# Patient Record
Sex: MALE | Race: WHITE | Employment: FULL TIME | URBAN - METROPOLITAN AREA
[De-identification: names, ages, dates, MRNs, and addresses within clinical notes are randomized per-mention and may not be internally consistent; named-entity substitution may affect disease eponyms.]

---

## 2019-12-16 ENCOUNTER — OFFICE VISIT (OUTPATIENT)
Dept: FAMILY MEDICINE | Facility: OTHER | Age: 24
End: 2019-12-16
Payer: COMMERCIAL

## 2019-12-16 VITALS
WEIGHT: 284.6 LBS | BODY MASS INDEX: 35.39 KG/M2 | SYSTOLIC BLOOD PRESSURE: 148 MMHG | HEIGHT: 75 IN | RESPIRATION RATE: 16 BRPM | HEART RATE: 50 BPM | DIASTOLIC BLOOD PRESSURE: 88 MMHG | OXYGEN SATURATION: 99 % | TEMPERATURE: 97.9 F

## 2019-12-16 DIAGNOSIS — R03.0 ELEVATED BLOOD PRESSURE READING WITHOUT DIAGNOSIS OF HYPERTENSION: ICD-10-CM

## 2019-12-16 DIAGNOSIS — J01.00 ACUTE NON-RECURRENT MAXILLARY SINUSITIS: Primary | ICD-10-CM

## 2019-12-16 PROCEDURE — 99203 OFFICE O/P NEW LOW 30 MIN: CPT | Performed by: PHYSICIAN ASSISTANT

## 2019-12-16 RX ORDER — METHOCARBAMOL 500 MG/1
500 TABLET, FILM COATED ORAL PRN
COMMUNITY
Start: 2019-03-19 | End: 2021-05-07

## 2019-12-16 ASSESSMENT — MIFFLIN-ST. JEOR: SCORE: 2372.82

## 2019-12-16 NOTE — PROGRESS NOTES
Subjective     Guillermo Terrell is a 24 year old male who presents to clinic today for the following health issues:    HPI   Acute Illness   Acute illness concerns: possible sinus infection, has been off and on for the last couple of months  Onset: 3 days this time around.     Fever: no    Chills/Sweats: no    Headache (location?): YES- sometimes    Sinus Pressure:YES    Conjunctivitis:  no    Ear Pain: YES: both, has had a ringing in left ear for probably 2-3 months.     Rhinorrhea: YES    Congestion: YES    Sore Throat: YES     Cough: YES-non-productive    Wheeze: no    Decreased Appetite: yes, yesterday.     Nausea: YES- yesterday he felt sick to his stomach    Vomiting: no    Diarrhea:  no    Dysuria/Freq.: YES- frequency     Fatigue/Achiness: YES- but has had a messed up back for a long time     Sick/Strep Exposure: no     Therapies Tried and outcome: dayquil/nyquil, mucinex-d    Has been on and off sick for the last few months.  Always has issues with mucus in his throat, nasal congestion. Having constant tooth pain now.  No fevers during any of this. No history of sinus problems.     Current Outpatient Medications   Medication Sig Dispense Refill     Acetaminophen (TYLENOL PO) Take  by mouth.       amoxicillin-clavulanate (AUGMENTIN) 875-125 MG tablet Take 1 tablet by mouth 2 times daily for 10 days 20 tablet 0     ibuprofen (ADVIL,MOTRIN) 800 MG tablet Take 800 mg by mouth every 8 hours as needed.       methocarbamol (ROBAXIN) 500 MG tablet Take 500 mg by mouth as needed       Allergies   Allergen Reactions     Lorabid [Cephalosporins] Rash     When he was around [age] year old.       Reviewed and updated as needed this visit by Provider         Review of Systems   ROS COMP: Constitutional, HEENT, cardiovascular, pulmonary, gi and gu systems are negative, except as otherwise noted.      Objective    BP (!) 148/88 (BP Location: Left arm, Patient Position: Chair, Cuff Size: Adult Regular)   Pulse 50   Temp 97.9  " F (36.6  C) (Oral)   Resp 16   Ht 1.915 m (6' 3.39\")   Wt 129.1 kg (284 lb 9.6 oz)   SpO2 99%   BMI 35.20 kg/m    Body mass index is 35.2 kg/m .  Physical Exam   GENERAL: healthy, alert and no distress  EYES: Eyes grossly normal to inspection, PERRL and conjunctivae and sclerae normal  HENT: normal cephalic/atraumatic, ear canals and TM's normal, nasal mucosa edematous , rhinorrhea white, oropharynx clear, oral mucous membranes moist and sinuses: maxillary tenderness on bilateral, maxillary  NECK: no adenopathy, no asymmetry, masses, or scars and thyroid normal to palpation  RESP: lungs clear to auscultation - no rales, rhonchi or wheezes  CV: regular rate and rhythm, normal S1 S2, no S3 or S4, no murmur, click or rub, no peripheral edema and peripheral pulses strong  MS: no gross musculoskeletal defects noted, no edema    Diagnostic Test Results:  Labs reviewed in Epic        Assessment & Plan       ICD-10-CM    1. Acute non-recurrent maxillary sinusitis J01.00 amoxicillin-clavulanate (AUGMENTIN) 875-125 MG tablet   2. Elevated blood pressure reading without diagnosis of hypertension R03.0           High suspicion of sinusitis given the persistent symptoms.  Recommended nasal steroids, nasal saline rinses and continued use of decongestants.  Started on Augmentin as well.  Recommended tylenol/ibuprofen as needed.  Discussed how to use the OTC products.  Encouraged to follow-up in the next 2 weeks with nurse or pharmacy BP re-check once he is off decongestants.     Return in about 5 days (around 12/21/2019) for If not improving, sooner if worse or new concerns.     Options for treatment and follow-up care were reviewed with the patient and/or guardian. Patient and/or guardian engaged in the decision making process and verbalized understanding of the options discussed and agreed with the final plan.     Tiny Angel PA-C  [facility]"

## 2020-01-13 ENCOUNTER — NURSE TRIAGE (OUTPATIENT)
Dept: NURSING | Facility: CLINIC | Age: 25
End: 2020-01-13
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
## 2020-11-24 ENCOUNTER — VIRTUAL VISIT (OUTPATIENT)
Dept: FAMILY MEDICINE | Facility: OTHER | Age: 25
End: 2020-11-24
Payer: COMMERCIAL

## 2020-11-24 DIAGNOSIS — J30.89 ALLERGIC RHINITIS DUE TO OTHER ALLERGIC TRIGGER, UNSPECIFIED SEASONALITY: ICD-10-CM

## 2020-11-24 DIAGNOSIS — J32.0 CHRONIC MAXILLARY SINUSITIS: Primary | ICD-10-CM

## 2020-11-24 PROCEDURE — 99213 OFFICE O/P EST LOW 20 MIN: CPT | Mod: TEL | Performed by: PHYSICIAN ASSISTANT

## 2020-11-24 NOTE — PROGRESS NOTES
"Guillermo Terrell is a 24 year old male who is being evaluated via a billable telephone visit.      The patient has been notified of following:     \"This telephone visit will be conducted via a call between you and your physician/provider. We have found that certain health care needs can be provided without the need for a physical exam.  This service lets us provide the care you need with a short phone conversation.  If a prescription is necessary we can send it directly to your pharmacy.  If lab work is needed we can place an order for that and you can then stop by our lab to have the test done at a later time.    Telephone visits are billed at different rates depending on your insurance coverage. During this emergency period, for some insurers they may be billed the same as an in-person visit.  Please reach out to your insurance provider with any questions.    If during the course of the call the physician/provider feels a telephone visit is not appropriate, you will not be charged for this service.\"    Patient has given verbal consent for Telephone visit?  Yes    What phone number would you like to be contacted at? [phone]    How would you like to obtain your AVS? Mail a copy    Subjective     Guillermo Terrell is a 24 year old male who presents via phone visit today for the following health issues:    HPI     Acute Illness  Acute illness concerns: sinus infection  Onset/Duration: 2 days   Symptoms:  Fever: no  Chills/Sweats: YES  Headache (location?): no  Sinus Pressure: YES  Conjunctivitis:  YES  Ear Pain: no  Rhinorrhea: YES  Congestion: no  Sore Throat: no  Cough: YES-non-productive  Wheeze: no  Decreased Appetite: no  Nausea: no  Vomiting: no  Diarrhea: no  Dysuria/Freq.: no  Dysuria or Hematuria: no  Rashes: no  Loss of taste/smell: no  Fatigue/Achiness: YES  Sick/Strep Exposure: no  Therapies tried and outcome: None    - Woke up on Monday morning.  He has had a sore throat.  His teeth have been very painful.  " Sinuses have been plugged up really bad.    - He has had runny nose and cough prior to symptoms starting.   - Taking Mucinex D everyday pretty much regardless of acute infection  - Also uses nasal rinses and flonase.   - Getting COVID tested regularly for his work.  He is still waiting on COVID test that he did today at noon.   - He says he always gets sick and isn't sure why.  He always seems to have issues with recurrent colds.     Review of Systems   Constitutional, HEENT, cardiovascular, pulmonary, gi and gu systems are negative, except as otherwise noted.       Objective          Vitals:  No vitals were obtained today due to virtual visit.    healthy, alert and no distress  PSYCH: Alert and oriented times 3; coherent speech, normal   rate and volume, able to articulate logical thoughts, able   to abstract reason, no tangential thoughts, no hallucinations   or delusions  His affect is normal  RESP: No cough, no audible wheezing, able to talk in full sentences  Remainder of exam unable to be completed due to telephone visits    No results found for this or any previous visit (from the past 24 hour(s)).        Assessment/Plan:    Assessment & Plan     Guillermo was seen today for sinus problem.    Diagnoses and all orders for this visit:    Chronic maxillary sinusitis  -     amoxicillin-clavulanate (AUGMENTIN) 875-125 MG tablet; Take 1 tablet by mouth 2 times daily for 7 days    Allergic rhinitis due to other allergic trigger, unspecified seasonality  -     ALLERGY/ASTHMA ADULT REFERRAL              Elected to start antibiotics because he has issues with chronic rhinorrhea and nasal congestion and his sinus pain just developed in the past few days.   Did refer to allergy and may need to see ENT as well pending the evaluation for recurrent illnesses and allergy symptoms.  Recommended using Flonase 2 sprays BID and nasal saline rinses TID right now.  Continue on Mucinex D.   He will start on antibiotics.  If not  improving recommend evaluation F2F.     Return in about 1 week (around 12/1/2020) for If not improving, sooner if worse or new concerns.    Options for treatment and follow-up care were reviewed with the patient and/or guardian. Patient and/or guardian engaged in the decision making process and verbalized understanding of the options discussed and agreed with the final plan.    Tiny Angel PA-C  [facility]    Phone call duration:  5:20 minutes

## 2020-12-01 ENCOUNTER — OFFICE VISIT (OUTPATIENT)
Dept: ALLERGY | Facility: OTHER | Age: 25
End: 2020-12-01
Attending: PHYSICIAN ASSISTANT
Payer: COMMERCIAL

## 2020-12-01 VITALS
HEART RATE: 52 BPM | WEIGHT: 297 LBS | BODY MASS INDEX: 36.93 KG/M2 | DIASTOLIC BLOOD PRESSURE: 78 MMHG | HEIGHT: 75 IN | SYSTOLIC BLOOD PRESSURE: 137 MMHG | OXYGEN SATURATION: 97 %

## 2020-12-01 DIAGNOSIS — J30.1 SEASONAL ALLERGIC RHINITIS DUE TO POLLEN: Primary | ICD-10-CM

## 2020-12-01 PROBLEM — H10.9 RHINOCONJUNCTIVITIS: Status: ACTIVE | Noted: 2020-12-01

## 2020-12-01 PROBLEM — J31.0 RHINOCONJUNCTIVITIS: Status: ACTIVE | Noted: 2020-12-01

## 2020-12-01 PROCEDURE — 36415 COLL VENOUS BLD VENIPUNCTURE: CPT | Performed by: ALLERGY & IMMUNOLOGY

## 2020-12-01 PROCEDURE — 86003 ALLG SPEC IGE CRUDE XTRC EA: CPT | Performed by: ALLERGY & IMMUNOLOGY

## 2020-12-01 PROCEDURE — 95004 PERQ TESTS W/ALRGNC XTRCS: CPT | Performed by: ALLERGY & IMMUNOLOGY

## 2020-12-01 PROCEDURE — 99203 OFFICE O/P NEW LOW 30 MIN: CPT | Mod: 25 | Performed by: ALLERGY & IMMUNOLOGY

## 2020-12-01 ASSESSMENT — MIFFLIN-ST. JEOR: SCORE: 2422.81

## 2020-12-01 ASSESSMENT — PAIN SCALES - GENERAL: PAINLEVEL: NO PAIN (0)

## 2020-12-01 NOTE — PROGRESS NOTES
Guillermo Terrell is a 24 year old White male with previous medical history significant for allergic rhinitis. Guillermo Terrell is being seen today for evaluation of seasonal allergies. The patient is being seen in consultation at the request of Tiny Angel PA-C.     Patient reports that over the last few years he has had perennial with spring and fall nasal and ocular symptoms including ocular watering, ocular itching, congestion, throat itching, postnasal drainage and rhinorrhea.  Increase symptoms around cats.  Mucinex D has been beneficial.  Zyrtec, Claritin and Allegra have been tried and beneficial.  Uses Flonase 2 sprays per nostril approximately once per week.  This is beneficial when used.  No history of allergy testing.  Currently being treated for sinusitis.  He reports that he has had 2 total sinus infections.  No history of montelukast.  Nonatopic family history.    ENVIRONMENTAL HISTORY: [place] [facility] [place] The home is heated with a forced air. They do have central air conditioning. The patient's bedroom is furnished with hard bethel in bedroom.  Pets inside the house include 0 pets. There is no history of cockroach or mice infestation. There is/are 0 smokers in the house.  The house does not have a damp basement.     Past Medical History:   Diagnosis Date     Lyme disease 2000     Family History   Problem Relation Age of Onset     Breast Cancer Maternal Grandmother      Circulatory Paternal Grandmother         Annuersym     Diabetes Mother         Type 1     Diabetes Other         paternal cousin Type 1     Past Surgical History:   Procedure Laterality Date     TONSILLECTOMY & ADENOIDECTOMY  12/06       REVIEW OF SYSTEMS:  General: negative for weight gain. negative for weight loss. negative for changes in sleep.   Ears: negative for fullness. negative for hearing loss. negative for dizziness.   Nose: negative for snoring.negative for changes in smell. positive  for  drainage.   Eyes: positive  for eye watering. positive  for eye itching. negative for vision changes. negative for eye redness.  Throat: negative for hoarseness. negative for sore throat. negative for trouble swallowing.   Lungs: negative for shortness of breath.negative for wheezing. negative for sputum production.   Cardiovascular: negative for chest pain. negative for swelling of ankles. negative for fast or irregular heartbeat.   Gastrointestinal: negative for nausea. negative for heartburn. negative for acid reflux.   Musculoskeletal: negative for joint pain. negative for joint stiffness. negative for joint swelling.   Neurologic: negative for seizures. negative for fainting. negative for weakness.   Psychiatric: negative for changes in mood. negative for anxiety.   Endocrine: negative for cold intolerance. negative for heat intolerance. negative for tremors.   Lymphatic: negative for lower extremity swelling. negative for lymph node swelling.   Hematologic: negative for easy bruising. negative for easy bleeding.  Integumentary: negative for rash. negative for scaling. negative for nail changes.       Current Outpatient Medications:      Acetaminophen (TYLENOL PO), Take  by mouth., Disp: , Rfl:      amoxicillin-clavulanate (AUGMENTIN) 875-125 MG tablet, Take 1 tablet by mouth 2 times daily for 7 days, Disp: 14 tablet, Rfl: 0     ibuprofen (ADVIL,MOTRIN) 800 MG tablet, Take 800 mg by mouth every 8 hours as needed., Disp: , Rfl:      methocarbamol (ROBAXIN) 500 MG tablet, Take 500 mg by mouth as needed, Disp: , Rfl:      Pseudoephedrine-guaiFENesin (MUCINEX D PO), , Disp: , Rfl:   Immunization History   Administered Date(s) Administered     DTAP (<7y) 10/15/1997, 06/08/2001     HPV Quadrivalent 06/25/2013, 09/17/2013, 03/27/2014     HepB 1995, 01/30/1996, 12/17/1996     Hib (PRP-T) 10/15/1997     Influenza (H1N1) 01/19/2010     Influenza (IIV3) PF 10/09/1998, 11/02/2005, 10/30/2006     MMR 12/17/1996,  06/08/2001     Meningococcal (Menactra ) 06/20/2008     Meningococcal (Menveo ) 06/25/2013     OPV, trivalent, live 01/30/1996, 04/24/1996, 08/14/1996     Poliovirus, inactivated (IPV) 06/08/2001     TDAP Vaccine (Boostrix) 06/20/2008     Tetramune (DtP/HIB) 01/30/1996, 04/24/1996, 08/14/1996     Varicella 12/17/1996, 06/20/2008     Allergies   Allergen Reactions     Lorabid [Cephalosporins] Rash     When he was around [age] year old.         EXAM:   Constitutional:  Appears well-developed and well-nourished. No distress.   HEENT:   Head: Normocephalic.   Nasal tissue pink and normal appearing.  No rhinorrhea noted.    Eyes: Conjunctivae are non-erythematous   No maxillary or frontal sinus tenderness to palpation.   Cardiovascular: Normal rate, regular rhythm and normal heart sounds. Exam reveals no gallop and no friction rub.   No murmur heard.  Respiratory: Effort normal and breath sounds normal. No respiratory distress. No wheezes. No rales.   Musculoskeletal: Normal range of motion.   Neuro: Oriented to person, place, and time.  Skin: Skin is warm and dry. No rash noted.   Psychiatric: Normal mood and affect.     Nursing note and vitals reviewed.    WORKUP:   ENVIRONMENTAL PERCUTANEOUS SKIN TESTING: ADULT  [facility] [place] [employer] 12/1/2020   Consent Y   Ordering Physician Lucero   Interpreting Physician Lucero   Testing Technician Aster   Location Back   Time start:  3:15 PM   Time End:  3:30 PM   Positive Control: Histatrol*ALK 1 mg/ml 3/35   Negative Control: 50% Glycerin 0   Cat Hair*ALK (10,000 BAU/ml) 0   AP Dog Hair/Dander (1:100 w/v) 0   Dust Mite p. 30,000 AU/ml 0   Dust Mite f. (30,000 AU/ml) 0   Hardeep (W/F in millimeters) 0   Romaine Grass (100,000 BAU/mL) 0   Red Cedar (W/F in millimeters) 0   Maple/Lavaca (W/F in millimeters) 4/35   Hackberry (W/F in millimeters) 0   Dawes (W/F in millimeters) 0   [place]wood *ALK (W/F in millimeters) 0   American Elm (W/F in millimeters) 0   Hickman (W/F in  millimeters) 0   Black Hardy (W/F in millimeters) 0   Birch Mix (W/F in millimeters) 0   Pine Top (W/F in millimeters) 0   Oak (W/F in millimeters) 0   Cocklebur (W/F in millimeters) 0/8   [place] (W/F in millimeters) 0/18   White Eligio (W/F in millimeters) 4/35   Careless (W/F in millimeters) 0   Nettle (W/F in millimeters) 0   English Plantain (W/F in millimeters) 0   Kochia (W/F in millimeters) 0/6   Lamb's Quarter (W/F in millimeters) 0   Marshelder (W/F in millimeters) 0   Ragweed Mix* ALK (W/F in millimeters) 0   Russian Thistle (W/F in millimeters) 0   Sagebrush/Mugwort (W/F in millimeters) 0   Sheep Sorrel (W/F in millimeters) 0   Feather Mix* ALK (W/F in millimeters) 0   Penicillium Mix (1:10 w/v) 0   Curvularia spicifera (1:10 w/v) 0   Epicoccum (1:10 w/v) 0   Aspergillus fumigatus (1:10 w/v): 0   Alternaria tenius (1:10 w/v) 0   H. Cladosporium (1:10 w/v) 0   Phoma herbarum (1:10 w/v) 0        ASSESSMENT/PLAN:  Problem List Items Addressed This Visit        Respiratory    Seasonal allergic rhinitis due to pollen - Primary     Perennial with spring and fall worsening nasal and ocular symptoms.  Increase symptoms around cats.  Allergy testing done today only positive for trees.  Discussed avoidance measures.    -Serum IgE for environmental allergens.  -Start using Flonase 2 sprays per nostril daily consistently.  -Mucinex D as this has been beneficial.  -ENT referral.  Allergy testing not consistent with perennial symptoms.  As noted double checking with blood testing.  -Continue treatment for sinusitis.         Relevant Orders    Allergen cat epithellium IgE (Completed)    Allergen dog epithelium IgE (Completed)    Allergen safia IgE (Completed)    Allergen D farinae IgE (Completed)    Allergen D pteronyssinus IgE (Completed)    Allergen alternaria alternata IgE (Completed)    Allergen Epicoccum purpurascens IgE (Completed)    Allergen aspergillus fumigatus IgE (Completed)    Allergen giant ragweed IgE  (Completed)    Allergen ragweed short IgE (Completed)    OTOLARYNGOLOGY REFERRAL    ALLERGY SKIN TESTS,ALLERGENS [[id number]] (Completed)          Chart documentation with Dragon Voice recognition Software. Although reviewed after completion, some words and grammatical errors may remain.    DO ALICE AskewAAI  Medical Director for Allergy/Immunology at [place], [place]

## 2020-12-01 NOTE — PATIENT INSTRUCTIONS
Allergy Staff Appt Hours Shot Hours Locations    Physician     Kelvin aBrker DO       Support Staff     BRIA Lloyd, MONICA  Tuesday:        [place] 7-5 Wednesday:        [place]: 7-5 Thursday:                    [place] 7-6     Friday:  [place]  7-2   [place]        Thursday: 8-5:20        Friday: 7-12     [place]        Tuesday: 7- 3:20 Wednesday: 7-4:20     [place] Monday: 7-2:20 Tuesday: 9-5:20         [facility]  [address] [place] [address]  Appt Line: [phone]  Allergy RN:  [phone]    [facility]  [address] [place] [address]  Appt Line: [phone]  Allergy RN:  [phone]       Important Scheduling Information  Aspirin Desensitization: Appt will last 2 clinic days. Please call the Allergy RN line for your clinic to schedule. Discontinue antihistamines 7 days prior to the appointment.     Food Challenges: Appt will last 3-4 hours. Please call the Allergy RN line for your clinic to schedule. Discontinue antihistamines 7 days prior to the appointment.     Penicillin Testing: Appt will last 2-3 hours. Please call the Allergy RN line for your clinic to schedule. Discontinue antihistamines 7 days prior to the appointment.     Skin Testing: Appt will about 40 minutes. Call the appointment line for your clinic to schedule. Discontinue antihistamines 7 days prior to the appointment.     Venom Testing: Appt will last 2-3 hours. Please call the Allergy RN line for your clinic to schedule. Discontinue antihistamines 7 days prior to the appointment.     Thank you for trusting us with your Allergy, Asthma, and Immunology care. Please feel free to contact us with any questions or concerns you may have.      - Flonase 2 sprays/nostril daily.   - Mucinex-D as needed.   - ENT referral   - Allergy blood testing today.     AEROALLERGEN AVOIDANCE INSTRUCTIONS  POLLEN  Pollens are the tiny airborne particles given off by trees, weeds, and  grasses. They can be the cause of seasonal allergic rhinitis or hay fever symptoms, which include stuffy, itchy, runny nose, redness, swelling and itching of the eyes, and itching of the ears and throat. Here are some tips on how to avoid pollen exposure.  1. .Keep windows closed and use the air conditioner when possible.  2.  Avoid outside exposure in the early morning as pollen counts are highest at that time.  3.  Take a shower and wash hair each night.  4.  Consider wearing a mask when working in the yard and/or garden.  5.  Clean furnace filter monthly with HEPA filters. Consider a HEPA filter vacuum  which will prevent pollen from being reintroduced into the air.

## 2020-12-01 NOTE — LETTER
12/1/2020         RE: Guillermo Terrell  [address] [place] [address]        Dear Colleague,    Thank you for referring your patient, Guillermo Terrell, to the [facility]. Please see a copy of my visit note below.    Guillermo Terrell is a 24 year old White male with previous medical history significant for allergic rhinitis. Guillermo Terrell is being seen today for evaluation of seasonal allergies. The patient is being seen in consultation at the request of Tiny Angel PA-C.     Patient reports that over the last few years he has had perennial with spring and fall nasal and ocular symptoms including ocular watering, ocular itching, congestion, throat itching, postnasal drainage and rhinorrhea.  Increase symptoms around cats.  Mucinex D has been beneficial.  Zyrtec, Claritin and Allegra have been tried and beneficial.  Uses Flonase 2 sprays per nostril approximately once per week.  This is beneficial when used.  No history of allergy testing.  Currently being treated for sinusitis.  He reports that he has had 2 total sinus infections.  No history of montelukast.  Nonatopic family history.    ENVIRONMENTAL HISTORY: [place] [facility] [place] The home is heated with a forced air. They do have central air conditioning. The patient's bedroom is furnished with hard bethel in bedroom.  Pets inside the house include 0 pets. There is no history of cockroach or mice infestation. There is/are 0 smokers in the house.  The house does not have a damp basement.     Past Medical History:   Diagnosis Date     Lyme disease 2000     Family History   Problem Relation Age of Onset     Breast Cancer Maternal Grandmother      Circulatory Paternal Grandmother         Annuersym     Diabetes Mother         Type 1     Diabetes Other         paternal cousin Type 1     Past Surgical History:   Procedure Laterality Date     TONSILLECTOMY & ADENOIDECTOMY  12/06       REVIEW OF  SYSTEMS:  General: negative for weight gain. negative for weight loss. negative for changes in sleep.   Ears: negative for fullness. negative for hearing loss. negative for dizziness.   Nose: negative for snoring.negative for changes in smell. positive  for drainage.   Eyes: positive  for eye watering. positive  for eye itching. negative for vision changes. negative for eye redness.  Throat: negative for hoarseness. negative for sore throat. negative for trouble swallowing.   Lungs: negative for shortness of breath.negative for wheezing. negative for sputum production.   Cardiovascular: negative for chest pain. negative for swelling of ankles. negative for fast or irregular heartbeat.   Gastrointestinal: negative for nausea. negative for heartburn. negative for acid reflux.   Musculoskeletal: negative for joint pain. negative for joint stiffness. negative for joint swelling.   Neurologic: negative for seizures. negative for fainting. negative for weakness.   Psychiatric: negative for changes in mood. negative for anxiety.   Endocrine: negative for cold intolerance. negative for heat intolerance. negative for tremors.   Lymphatic: negative for lower extremity swelling. negative for lymph node swelling.   Hematologic: negative for easy bruising. negative for easy bleeding.  Integumentary: negative for rash. negative for scaling. negative for nail changes.       Current Outpatient Medications:      Acetaminophen (TYLENOL PO), Take  by mouth., Disp: , Rfl:      amoxicillin-clavulanate (AUGMENTIN) 875-125 MG tablet, Take 1 tablet by mouth 2 times daily for 7 days, Disp: 14 tablet, Rfl: 0     ibuprofen (ADVIL,MOTRIN) 800 MG tablet, Take 800 mg by mouth every 8 hours as needed., Disp: , Rfl:      methocarbamol (ROBAXIN) 500 MG tablet, Take 500 mg by mouth as needed, Disp: , Rfl:      Pseudoephedrine-guaiFENesin (MUCINEX D PO), , Disp: , Rfl:   Immunization History   Administered Date(s) Administered     DTAP (<7y)  10/15/1997, 06/08/2001     HPV Quadrivalent 06/25/2013, 09/17/2013, 03/27/2014     HepB 1995, 01/30/1996, 12/17/1996     Hib (PRP-T) 10/15/1997     Influenza (H1N1) 01/19/2010     Influenza (IIV3) PF 10/09/1998, 11/02/2005, 10/30/2006     MMR 12/17/1996, 06/08/2001     Meningococcal (Menactra ) 06/20/2008     Meningococcal (Menveo ) 06/25/2013     OPV, trivalent, live 01/30/1996, 04/24/1996, 08/14/1996     Poliovirus, inactivated (IPV) 06/08/2001     TDAP Vaccine (Boostrix) 06/20/2008     Tetramune (DtP/HIB) 01/30/1996, 04/24/1996, 08/14/1996     Varicella 12/17/1996, 06/20/2008     Allergies   Allergen Reactions     Lorabid [Cephalosporins] Rash     When he was around [age] year old.         EXAM:   Constitutional:  Appears well-developed and well-nourished. No distress.   HEENT:   Head: Normocephalic.   Nasal tissue pink and normal appearing.  No rhinorrhea noted.    Eyes: Conjunctivae are non-erythematous   No maxillary or frontal sinus tenderness to palpation.   Cardiovascular: Normal rate, regular rhythm and normal heart sounds. Exam reveals no gallop and no friction rub.   No murmur heard.  Respiratory: Effort normal and breath sounds normal. No respiratory distress. No wheezes. No rales.   Musculoskeletal: Normal range of motion.   Neuro: Oriented to person, place, and time.  Skin: Skin is warm and dry. No rash noted.   Psychiatric: Normal mood and affect.     Nursing note and vitals reviewed.    WORKUP:   ENVIRONMENTAL PERCUTANEOUS SKIN TESTING: ADULT  [facility] 12/1/2020   Consent Y   Ordering Physician Lucero   Interpreting Physician Lucero   Testing Technician Aster   Location Back   Time start:  3:15 PM   Time End:  3:30 PM   Positive Control: Histatrol*ALK 1 mg/ml 3/35   Negative Control: 50% Glycerin 0   Cat Hair*ALK (10,000 BAU/ml) 0   AP Dog Hair/Dander (1:100 w/v) 0   Dust Mite p. 30,000 AU/ml 0   Dust Mite f. (30,000 AU/ml) 0   Hardeep (W/F in millimeters) 0   Romaine Grass (100,000  BAU/mL) 0   Red [place] (W/F in millimeters) 0   Maple/Sabine (W/F in millimeters) 4/35   Hackberry (W/F in millimeters) 0   [place] (W/F in millimeters) 0   [place] *ALK (W/F in millimeters) 0   American Elm (W/F in millimeters) 0   Entiat (W/F in millimeters) 0   Black [place] (W/F in millimeters) 0   Birch Mix (W/F in millimeters) 0   Gridley (W/F in millimeters) 0   Oak (W/F in millimeters) 0   Cocklebur (W/F in millimeters) 0/8   [place] (W/F in millimeters) 0/18   White Eligio (W/F in millimeters) 4/35   Careless (W/F in millimeters) 0   Nettle (W/F in millimeters) 0   English Plantain (W/F in millimeters) 0   Kochia (W/F in millimeters) 0/6   Lamb's Quarter (W/F in millimeters) 0   Marshelder (W/F in millimeters) 0   Ragweed Mix* ALK (W/F in millimeters) 0   Russian Thistle (W/F in millimeters) 0   Sagebrush/Mugwort (W/F in millimeters) 0   Sheep Sorrel (W/F in millimeters) 0   Feather Mix* ALK (W/F in millimeters) 0   Penicillium Mix (1:10 w/v) 0   Curvularia spicifera (1:10 w/v) 0   Epicoccum (1:10 w/v) 0   Aspergillus fumigatus (1:10 w/v): 0   Alternaria tenius (1:10 w/v) 0   H. Cladosporium (1:10 w/v) 0   Phoma herbarum (1:10 w/v) 0        ASSESSMENT/PLAN:  Problem List Items Addressed This Visit        Respiratory    Seasonal allergic rhinitis due to pollen - Primary     Perennial with spring and fall worsening nasal and ocular symptoms.  Increase symptoms around cats.  Allergy testing done today only positive for trees.  Discussed avoidance measures.    -Serum IgE for environmental allergens.  -Start using Flonase 2 sprays per nostril daily consistently.  -Mucinex D as this has been beneficial.  -ENT referral.  Allergy testing not consistent with perennial symptoms.  As noted double checking with blood testing.  -Continue treatment for sinusitis.         Relevant Orders    Allergen cat epithellium IgE (Completed)    Allergen dog epithelium IgE (Completed)    Allergen safia IgE (Completed)     Allergen D farinae IgE (Completed)    Allergen D pteronyssinus IgE (Completed)    Allergen alternaria alternata IgE (Completed)    Allergen Epicoccum purpurascens IgE (Completed)    Allergen aspergillus fumigatus IgE (Completed)    Allergen giant ragweed IgE (Completed)    Allergen ragweed short IgE (Completed)    OTOLARYNGOLOGY REFERRAL    ALLERGY SKIN TESTS,ALLERGENS [46061] (Completed)          Chart documentation with Dragon Voice recognition Software. Although reviewed after completion, some words and grammatical errors may remain.    Kelvin Barker DO FAAAAI  Medical Director for Allergy/Immunology at [place], [place]        Again, thank you for allowing me to participate in the care of your patient.        Sincerely,        Kelvin Barker DO

## 2020-12-01 NOTE — ASSESSMENT & PLAN NOTE
Perennial with spring and fall worsening nasal and ocular symptoms.  Increase symptoms around cats.  Allergy testing done today only positive for trees.  Discussed avoidance measures.    -Serum IgE for environmental allergens.  -Start using Flonase 2 sprays per nostril daily consistently.  -Mucinex D as this has been beneficial.  -ENT referral.  Allergy testing not consistent with perennial symptoms.  As noted double checking with blood testing.  -Continue treatment for sinusitis.

## 2020-12-03 LAB
A ALTERNATA IGE QN: <0.1 KU(A)/L
A FUMIGATUS IGE QN: <0.1 KU(A)/L
CAT DANDER IGG QN: <0.1 KU(A)/L
COMMON RAGWEED IGE QN: 0.7 KU(A)/L
D FARINAE IGE QN: 0.12 KU(A)/L
D PTERONYSS IGE QN: <0.1 KU(A)/L
DOG DANDER+EPITH IGE QN: <0.1 KU(A)/L
E PURPURASCENS IGE QN: <0.1 KU(A)/L
GIANT RAGWEED IGE QN: 0.63 KU(A)/L
TIMOTHY IGE QN: 0.85 KU(A)/L

## 2020-12-03 NOTE — RESULT ENCOUNTER NOTE
Please call patient and inform that positive allergy testing for grass, dust mites and ragweed. Cat negative on blood testing. This correlates more with his symptoms. Continue plan as discussed. Still keep ENT appointment. Thanks.     Dr. Barker

## 2020-12-04 ENCOUNTER — TELEPHONE (OUTPATIENT)
Dept: ALLERGY | Facility: OTHER | Age: 25
End: 2020-12-04

## 2020-12-04 DIAGNOSIS — J32.9 SINUSITIS, UNSPECIFIED CHRONICITY, UNSPECIFIED LOCATION: Primary | ICD-10-CM

## 2020-12-04 NOTE — TELEPHONE ENCOUNTER
Yes lets do another 7 days. Twice daily. I will send script. Please inform the patient. Thanks.

## 2020-12-04 NOTE — TELEPHONE ENCOUNTER
RN spoke with patient regarding lab results. patient verbalized understanding.     Patient states he was given Augmentin on 11/24/20 from PCP for sinus infection.  His symptoms improved, but not entirely.  He finished rx two days ago and yesterday started developing more symptoms.  States has pain in teeth, facial pressure.  He's wondering if he can get another rx.  Please advise, thanks.    Aster Dukes RN

## 2020-12-04 NOTE — TELEPHONE ENCOUNTER
RN left message for patient to return call to [phone] regarding result note from provider:    Kelvin Barker DO  [facility] Dr. Lucero Holliday             Please call patient and inform that positive allergy testing for grass, dust mites and ragweed. Cat negative on blood testing. This correlates more with his symptoms. Continue plan as discussed. Still keep ENT appointment. Thanks.        Aster Dukes RN

## 2020-12-09 ENCOUNTER — OFFICE VISIT (OUTPATIENT)
Dept: OTOLARYNGOLOGY | Facility: OTHER | Age: 25
End: 2020-12-09
Payer: COMMERCIAL

## 2020-12-09 ENCOUNTER — OFFICE VISIT (OUTPATIENT)
Dept: AUDIOLOGY | Facility: OTHER | Age: 25
End: 2020-12-09
Payer: COMMERCIAL

## 2020-12-09 VITALS
BODY MASS INDEX: 36.93 KG/M2 | DIASTOLIC BLOOD PRESSURE: 80 MMHG | HEIGHT: 75 IN | WEIGHT: 297 LBS | SYSTOLIC BLOOD PRESSURE: 130 MMHG

## 2020-12-09 DIAGNOSIS — H93.13 TINNITUS OF BOTH EARS: Primary | ICD-10-CM

## 2020-12-09 DIAGNOSIS — H93.13 TINNITUS, BILATERAL: ICD-10-CM

## 2020-12-09 DIAGNOSIS — J34.3 HYPERTROPHY OF INFERIOR NASAL TURBINATE: ICD-10-CM

## 2020-12-09 DIAGNOSIS — J34.89 NASAL OBSTRUCTION: ICD-10-CM

## 2020-12-09 DIAGNOSIS — H92.03 OTALGIA, BILATERAL: ICD-10-CM

## 2020-12-09 DIAGNOSIS — J34.2 DEVIATED NASAL SEPTUM: ICD-10-CM

## 2020-12-09 DIAGNOSIS — Z23 NEED FOR PROPHYLACTIC VACCINATION AND INOCULATION AGAINST INFLUENZA: Primary | ICD-10-CM

## 2020-12-09 DIAGNOSIS — J32.8 OTHER CHRONIC SINUSITIS: ICD-10-CM

## 2020-12-09 DIAGNOSIS — H61.23 BILATERAL IMPACTED CERUMEN: ICD-10-CM

## 2020-12-09 PROCEDURE — 92550 TYMPANOMETRY & REFLEX THRESH: CPT | Performed by: AUDIOLOGIST

## 2020-12-09 PROCEDURE — 31231 NASAL ENDOSCOPY DX: CPT | Performed by: OTOLARYNGOLOGY

## 2020-12-09 PROCEDURE — 99204 OFFICE O/P NEW MOD 45 MIN: CPT | Mod: 25 | Performed by: OTOLARYNGOLOGY

## 2020-12-09 PROCEDURE — 92557 COMPREHENSIVE HEARING TEST: CPT | Performed by: AUDIOLOGIST

## 2020-12-09 PROCEDURE — 99207 PR NO CHARGE LOS: CPT | Performed by: AUDIOLOGIST

## 2020-12-09 PROCEDURE — 69210 REMOVE IMPACTED EAR WAX UNI: CPT | Performed by: OTOLARYNGOLOGY

## 2020-12-09 ASSESSMENT — MIFFLIN-ST. JEOR: SCORE: 2417.81

## 2020-12-09 NOTE — PROGRESS NOTES
AUDIOLOGY REPORT:    Patient was referred from ENT by Dr. Triana for audiology evaluation. The patient reports constant bilateral tinnitus that is sometimes more noticeable in one ear or the other. The patient reports a history of occupational noise exposure and noise from firearm use. He reports that he had a threshold shift on a hearing test at work one year, but his thresholds were back to normal on subsequent testing. He reports that the shift was due to firearm noise and since then he has consistently used hearing protection. The patient has not noticed any recent changes in hearing. He reports that he had ear pressure before his ears were cleaned by Dr. Triana today. He also has sinus concerns.    Testing:    Otoscopy:   Otoscopic exam indicates ears are clear of cerumen bilaterally     Tympanograms:    RIGHT: normal eardrum mobility     LEFT:   normal eardrum mobility    Reflexes (reported by stimulus ear):  RIGHT: Ipsilateral is present at normal levels  RIGHT: Contralateral is present at elevated levels   LEFT:   Ipsilateral is present at normal levels  LEFT:   Contralateral is present at normal levels    Thresholds:   Pure Tone Thresholds assessed using conventional audiometry with good reliability from 250-8000 Hz bilaterally using insert earphones and circumaural headphones     RIGHT:  normal hearing sensitivity at all tested frequencies    LEFT:    normal hearing sensitivity at all tested frequencies    Speech Reception Threshold:    RIGHT: 10 dB HL    LEFT:   15 dB HL  Results are in agreement with pure tone average.     Word Recognition Score:     RIGHT: 96% at 55 dB HL using NU-6 recorded word list.    LEFT:   96% at 55 dB HL using NU-6 recorded word list.    Discussed results with the patient. Discussed the relationship between tinnitus and noise exposure and the importance of consistently using hearing protection. Discussed basic tinnitus management strategies, such as using sound  enrichment to avoid total silence.    Patient was returned to ENT for follow up.     Min Mercer, CCC-A  Licensed Audiologist #[id number]  12/9/2020

## 2020-12-09 NOTE — PROGRESS NOTES
ENT Consultation    Guillermo Terrell is a 25 year old male who is seen in consultation at the request of self.    WORKUP:   ENVIRONMENTAL PERCUTANEOUS SKIN TESTING: ADULT  [facility] 12/1/2020   Consent Y   Ordering Physician Lucero   Interpreting Physician Lucero   Testing Technician Aster Hassan Back   Time start:  3:15 PM   Time End:  3:30 PM   Positive Control: Histatrol*ALK 1 mg/ml 3/35   Negative Control: 50% Glycerin 0   Cat Hair*ALK (10,000 BAU/ml) 0   AP Dog Hair/Dander (1:100 w/v) 0   Dust Mite p. 30,000 AU/ml 0   Dust Mite f. (30,000 AU/ml) 0   Hardeep (W/F in millimeters) 0   Romaine Grass (100,000 BAU/mL) 0   Red Cedar (W/F in millimeters) 0   Maple/McDonald (W/F in millimeters) 4/35   Hackberry (W/F in millimeters) 0   Tyler (W/F in millimeters) 0   Harlan *ALK (W/F in millimeters) 0   American Elm (W/F in millimeters) 0   Ashburn (W/F in millimeters) 0   Black [place] (W/F in millimeters) 0   Birch Mix (W/F in millimeters) 0   [place] (W/F in millimeters) 0   Oak (W/F in millimeters) 0   Cocklebur (W/F in millimeters) 0/8   [place]on (W/F in millimeters) 0/18   White Eligio (W/F in millimeters) 4/35   Careless (W/F in millimeters) 0   Nettle (W/F in millimeters) 0   English Plantain (W/F in millimeters) 0   Kochia (W/F in millimeters) 0/6   Lamb's Quarter (W/F in millimeters) 0   Marshelder (W/F in millimeters) 0   Ragweed Mix* ALK (W/F in millimeters) 0   Russian Thistle (W/F in millimeters) 0   Sagebrush/Mugwort (W/F in millimeters) 0   Sheep Sorrel (W/F in millimeters) 0   Feather Mix* ALK (W/F in millimeters) 0   Penicillium Mix (1:10 w/v) 0   Curvularia spicifera (1:10 w/v) 0   Epicoccum (1:10 w/v) 0   Aspergillus fumigatus (1:10 w/v): 0   Alternaria tenius (1:10 w/v) 0   H. Cladosporium (1:10 w/v) 0   Phoma herbarum (1:10 w/v) 0         ASSESSMENT/PLAN:       Problem List Items Addressed This Visit                 Respiratory      Seasonal allergic rhinitis due to pollen -  Primary        Perennial with spring and fall worsening nasal and ocular symptoms.  Increase symptoms around cats.  Allergy testing done today only positive for trees.  Discussed avoidance measures.     -Serum IgE for environmental allergens.  -Start using Flonase 2 sprays per nostril daily consistently.  -Mucinex D as this has been beneficial.  -ENT referral.  Allergy testing not consistent with perennial symptoms.  As noted double checking with blood testing.  -Continue treatment for sinusitis.             Chief Complaint -   Chief Complaint   Patient presents with     Consult     sinuses       History of Present Illness - Guillermo Terrell is a 25 year old male who presents with concerns about sinus symptoms.  His main concern is nasal congestion obstruction.  That has been ongoing number of years getting worse.  He has been worked up for allergies and does appear to have some seasonal allergies but the nasal topical steroid sprays other allergy preparations improve his nasal breathing but not significantly.  He is constantly congested all the time.  He does experience some maxillary sinus pressure from time to time and sensitivity of the upper teeth.  He denies any purulent secretions just mostly clear postnasally.  Sense of smell and taste appear to be intact.  No history of migraines.  Second problem is both ears feel plugged especially the right side.  He is concerned about cerumen impaction.  He also has bilateral tinnitus that he has had for years.  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  Occasionally shoots guns without protection.  Denies any other history of ear disease or infections.      Past Medical History -   Past Medical History:   Diagnosis Date     Lyme disease 2000       Current Medications -   Current Outpatient Medications:      Acetaminophen (TYLENOL PO), Take  by mouth., Disp: , Rfl:      amoxicillin-clavulanate (AUGMENTIN) 875-125 MG tablet, Take 1  tablet by mouth 2 times daily for 7 days, Disp: 14 tablet, Rfl: 0     ibuprofen (ADVIL,MOTRIN) 800 MG tablet, Take 800 mg by mouth every 8 hours as needed., Disp: , Rfl:      methocarbamol (ROBAXIN) 500 MG tablet, Take 500 mg by mouth as needed, Disp: , Rfl:      Pseudoephedrine-guaiFENesin (MUCINEX D PO), , Disp: , Rfl:     Allergies -   Allergies   Allergen Reactions     Lorabid [Cephalosporins] Rash     When he was around [age] year old.       Social History -   Social History     Socioeconomic History     Marital status: Single     Spouse name: Not on file     Number of children: Not on file     Years of education: Not on file     Highest education level: Not on file   Occupational History     Not on file   Social Needs     Financial resource strain: Not on file     Food insecurity     Worry: Not on file     Inability: Not on file     Transportation needs     Medical: Not on file     Non-medical: Not on file   Tobacco Use     Smoking status: Current Some Day Smoker     Types: Cigarettes     Smokeless tobacco: Current User     Tobacco comment: sometimes   Substance and Sexual Activity     Alcohol use: Yes     Drug use: Not Currently     Types: Marijuana     Comment: not for over a year 11/24/2020     Sexual activity: Yes   Lifestyle     Physical activity     Days per week: Not on file     Minutes per session: Not on file     Stress: Not on file   Relationships     Social connections     Talks on phone: Not on file     Gets together: Not on file     Attends Islam service: Not on file     Active member of club or organization: Not on file     Attends meetings of clubs or organizations: Not on file     Relationship status: Not on file     Intimate partner violence     Fear of current or ex partner: Not on file     Emotionally abused: Not on file     Physically abused: Not on file     Forced sexual activity: Not on file   Other Topics Concern      Service Not Asked     Blood Transfusions Not Asked      Caffeine Concern Not Asked     Occupational Exposure Not Asked     Hobby Hazards Not Asked     Sleep Concern Not Asked     Stress Concern Not Asked     Weight Concern Not Asked     Special Diet Not Asked     Back Care Not Asked     Exercise Not Asked     Bike Helmet Not Asked     Seat Belt Not Asked     Self-Exams Not Asked   Social History Narrative     Not on file       Family History -   Family History   Problem Relation Age of Onset     Breast Cancer Maternal Grandmother      Circulatory Paternal Grandmother         Annuersym     Diabetes Mother         Type 1     Diabetes Other         paternal cousin Type 1       Review of Systems - As per HPI and PMHx, otherwise system review of the head and neck negative.    Physical Exam  There were no vitals taken for this visit.  BMI - There is no height or weight on file to calculate BMI.    General - The patient is well nourished and well developed, and appears to have good nutritional status.  Alert and oriented to person and place, answers questions and cooperates with examination appropriately.     SKIN - No suspicious lesions or rashes.  Respiration - No respiratory distress.    Head and Face - Normocephalic and atraumatic, with no gross asymmetry noted of the contour of the facial features.  The facial nerve is intact, with strong symmetric movements.    Voice and Breathing - The patient was breathing comfortably without the use of accessory muscles. There was no wheezing, stridor, or stertor.  The patients voice was clear and strong, and had appropriate pitch and quality.    Ears - Bilateral pinna and EACs with normal appearing overlying skin. Tympanic membrane intact with good mobility on pneumatic otoscopy bilaterally. Bony landmarks of the ossicular chain are normal. The tympanic membranes are normal in appearance. No retraction, perforation, or masses.  No fluid or purulence was seen in the external canal or the middle ear.     Eyes - Extraocular movements  intact.  Sclera were not icteric or injected, conjunctiva were pink and moist.    Mouth - Examination of the oral cavity showed pink, healthy oral mucosa. No lesions or ulcerations noted.  The tongue was mobile and midline, and the dentition were in good condition.      Throat - The walls of the oropharynx were smooth, pink, moist, symmetric, and had no lesions or ulcerations.  The tonsillar pillars and soft palate were symmetric.  The uvula was midline on elevation.    Neck - Normal midline excursion of the laryngotracheal complex during swallowing.  Full range of motion on passive movement.  Palpation of the occipital, submental, submandibular, internal jugular chain, and supraclavicular nodes did not demonstrate any abnormal lymph nodes or masses.  The carotid pulse was palpable bilaterally.  Palpation of the thyroid was soft and smooth, with no nodules or goiter appreciated.  The trachea was mobile and midline.    Nose - External contour is symmetric, no gross deflection or scars.  His nares are somewhat narrow at the bottom with wide base of the columella.  Nasal mucosa is pink and moist with no abnormal mucus.  The septum was somewhat deviated posteriorly to the right and obstructive, turbinates of large size and position.  No polyps, masses, or purulence noted on examination.        Performed in clinic today:  To further evaluate the nasal cavity, I performed rigid nasal endoscopy.  I first sprayed the nasal cavity bilaterally with a mix of lidocaine and neosynephrine.  I then began on the left side using a 2.7mm, 30 degree rigid nasal endoscope.  The septum was deviated and the nasal airway was open.  No abnormal secretions, purulence, or polyps were noted. The left middle turbinate and middle meatus were clearly visualized and normal in appearance.  Looking up, the olfactory cleft was unobstructed.  Going further back, the sphenoethmoid recess was normal in appearance, with healthy appearing mucosa on the  face of the sphenoid.  The nasopharynx was unremarkable, and the eustachian tube opening on this side was unobstructed.    I then turned my attention to the right side.  Once again, the septum was deviated, and the airway was partially obstructed.  Obstruction mostly involves a spur running along the posterior inferior aspect of the septum.  No abnormal secretions, purulence, polyps were noted.  The right middle turbinate and middle meatus were clearly visualized and normal in appearance.  Looking up, the olfactory cleft was unobstructed.  Going further back the right sphenoethmoid recess was normal in appearance, and eustachian tube opening was unobstructed.   Purple - [id number] Mytamed    Tenderness of magnified speculum first on the right and the left side using cerumen curette alligator forceps I removed large amount of cerumen.  Tympanic membrane on the right appears to be clear mobile to insufflation.  On the left also cerumen disimpaction was performed in the same fashion under the guidance of magnified speculum.  Patient tolerated procedure well.  Audiologic Studies - An audiogram and tympanogram were performed today as part of the evaluation and personally reviewed. The tympanogram shows normal Type A curves, with normal canal volumes and middle ear pressures.  There is no sign of eustachian tube dysfunction or middle ear effusion.  The audiogram was also normal.  The sensorineural hearing was age-appropriate, with no evidence of conductive hearing loss or significant asymmetry.      ASSESSMENT/PLAN:  Guillermo Terrell is a 25 year old male with with a nasal obstruction primarily due to some structural issues including the base of the columella narrowing the nares as well as deviated septum more vomer and maxillary crest associated spur rather than cartilage.  There is also concerned of some possibility of chronic sinusitis due to intermittent issues with the maxillary sinus pressure and sensitivity of the upper  teeth in spite of the fact no other symptoms were present.  In regard to his ears certainly hearing protection as discussed in the context of measurable normal hearing but present tinnitus.  Tinnitus coping strategies were also discussed.  At this point in regard to his nose will obtain CT of the sinuses to further evaluate and see if that remains part of the puzzle needs to be addressed as well.  We will have a virtual visit afterwards to discuss the results with the patient.  Weight beginning to discuss possibility of septoplasty using a Nathanael Tract balloon as a conservative measure for the maxillary crest spur as well as submucous resection of turbinates and restructuring the base of the columella to widen the nares as part of the septoplasty procedure.    Timur Triana MD

## 2020-12-09 NOTE — LETTER
12/9/2020         RE: Guillermo Terrell  [address] [place] [address]        Dear Colleague,    Thank you for referring your patient, Guillermo Terrell, to the [facility]. Please see a copy of my visit note below.    ENT Consultation    Guillermo Terrell is a 25 year old male who is seen in consultation at the request of self.    WORKUP:   ENVIRONMENTAL PERCUTANEOUS SKIN TESTING: ADULT  [place] [employer] 12/1/2020   Consent Y   Ordering Physician Lucero   Interpreting Physician Lucero   Testing Technician Aster   Location Back   Time start:  3:15 PM   Time End:  3:30 PM   Positive Control: Histatrol*ALK 1 mg/ml 3/35   Negative Control: 50% Glycerin 0   Cat Hair*ALK (10,000 BAU/ml) 0   AP Dog Hair/Dander (1:100 w/v) 0   Dust Mite p. 30,000 AU/ml 0   Dust Mite f. (30,000 AU/ml) 0   Hardeep (W/F in millimeters) 0   Romaine Grass (100,000 BAU/mL) 0   Red Cedar (W/F in millimeters) 0   Maple/Hubbard (W/F in millimeters) 4/35   Hackberry (W/F in millimeters) 0   [place] (W/F in millimeters) 0   Bomoseen *ALK (W/F in millimeters) 0   American Elm (W/F in millimeters) 0   [place] (W/F in millimeters) 0   Black [place] (W/F in millimeters) 0   Birch Mix (W/F in millimeters) 0   [place] (W/F in millimeters) 0   Oak (W/F in millimeters) 0   Cocklebur (W/F in millimeters) 0/8   Delmont (W/F in millimeters) 0/18   White Eligio (W/F in millimeters) 4/35   Careless (W/F in millimeters) 0   Nettle (W/F in millimeters) 0   English Plantain (W/F in millimeters) 0   Kochia (W/F in millimeters) 0/6   Lamb's Quarter (W/F in millimeters) 0   Marshelder (W/F in millimeters) 0   Ragweed Mix* ALK (W/F in millimeters) 0   Russian Thistle (W/F in millimeters) 0   Sagebrush/Mugwort (W/F in millimeters) 0   Sheep Sorrel (W/F in millimeters) 0   Feather Mix* ALK (W/F in millimeters) 0   Penicillium Mix (1:10 w/v) 0   Curvularia spicifera (1:10 w/v) 0   Epicoccum (1:10 w/v) 0   Aspergillus fumigatus (1:10  w/v): 0   Alternaria tenius (1:10 w/v) 0   H. Cladosporium (1:10 w/v) 0   Phoma herbarum (1:10 w/v) 0         ASSESSMENT/PLAN:       Problem List Items Addressed This Visit                 Respiratory      Seasonal allergic rhinitis due to pollen - Primary        Perennial with spring and fall worsening nasal and ocular symptoms.  Increase symptoms around cats.  Allergy testing done today only positive for trees.  Discussed avoidance measures.     -Serum IgE for environmental allergens.  -Start using Flonase 2 sprays per nostril daily consistently.  -Mucinex D as this has been beneficial.  -ENT referral.  Allergy testing not consistent with perennial symptoms.  As noted double checking with blood testing.  -Continue treatment for sinusitis.             Chief Complaint -   Chief Complaint   Patient presents with     Consult     sinuses       History of Present Illness - Guillermo Terrell is a 25 year old male who presents with concerns about sinus symptoms.  His main concern is nasal congestion obstruction.  That has been ongoing number of years getting worse.  He has been worked up for allergies and does appear to have some seasonal allergies but the nasal topical steroid sprays other allergy preparations improve his nasal breathing but not significantly.  He is constantly congested all the time.  He does experience some maxillary sinus pressure from time to time and sensitivity of the upper teeth.  He denies any purulent secretions just mostly clear postnasally.  Sense of smell and taste appear to be intact.  No history of migraines.  Second problem is both ears feel plugged especially the right side.  He is concerned about cerumen impaction.  He also has bilateral tinnitus that he has had for years.  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  Occasionally shoots guns without protection.  Denies any other history of ear disease or infections.      Past Medical History -    Past Medical History:   Diagnosis Date     Lyme disease 2000       Current Medications -   Current Outpatient Medications:      Acetaminophen (TYLENOL PO), Take  by mouth., Disp: , Rfl:      amoxicillin-clavulanate (AUGMENTIN) 875-125 MG tablet, Take 1 tablet by mouth 2 times daily for 7 days, Disp: 14 tablet, Rfl: 0     ibuprofen (ADVIL,MOTRIN) 800 MG tablet, Take 800 mg by mouth every 8 hours as needed., Disp: , Rfl:      methocarbamol (ROBAXIN) 500 MG tablet, Take 500 mg by mouth as needed, Disp: , Rfl:      Pseudoephedrine-guaiFENesin (MUCINEX D PO), , Disp: , Rfl:     Allergies -   Allergies   Allergen Reactions     Lorabid [Cephalosporins] Rash     When he was around [age] year old.       Social History -   Social History     Socioeconomic History     Marital status: Single     Spouse name: Not on file     Number of children: Not on file     Years of education: Not on file     Highest education level: Not on file   Occupational History     Not on file   Social Needs     Financial resource strain: Not on file     Food insecurity     Worry: Not on file     Inability: Not on file     Transportation needs     Medical: Not on file     Non-medical: Not on file   Tobacco Use     Smoking status: Current Some Day Smoker     Types: Cigarettes     Smokeless tobacco: Current User     Tobacco comment: sometimes   Substance and Sexual Activity     Alcohol use: Yes     Drug use: Not Currently     Types: Marijuana     Comment: not for over a year 11/24/2020     Sexual activity: Yes   Lifestyle     Physical activity     Days per week: Not on file     Minutes per session: Not on file     Stress: Not on file   Relationships     Social connections     Talks on phone: Not on file     Gets together: Not on file     Attends Sikh service: Not on file     Active member of club or organization: Not on file     Attends meetings of clubs or organizations: Not on file     Relationship status: Not on file     Intimate partner violence      Fear of current or ex partner: Not on file     Emotionally abused: Not on file     Physically abused: Not on file     Forced sexual activity: Not on file   Other Topics Concern      Service Not Asked     Blood Transfusions Not Asked     Caffeine Concern Not Asked     Occupational Exposure Not Asked     Hobby Hazards Not Asked     Sleep Concern Not Asked     Stress Concern Not Asked     Weight Concern Not Asked     Special Diet Not Asked     Back Care Not Asked     Exercise Not Asked     Bike Helmet Not Asked     Seat Belt Not Asked     Self-Exams Not Asked   Social History Narrative     Not on file       Family History -   Family History   Problem Relation Age of Onset     Breast Cancer Maternal Grandmother      Circulatory Paternal Grandmother         Annuersym     Diabetes Mother         Type 1     Diabetes Other         paternal cousin Type 1       Review of Systems - As per HPI and PMHx, otherwise system review of the head and neck negative.    Physical Exam  There were no vitals taken for this visit.  BMI - There is no height or weight on file to calculate BMI.    General - The patient is well nourished and well developed, and appears to have good nutritional status.  Alert and oriented to person and place, answers questions and cooperates with examination appropriately.     SKIN - No suspicious lesions or rashes.  Respiration - No respiratory distress.    Head and Face - Normocephalic and atraumatic, with no gross asymmetry noted of the contour of the facial features.  The facial nerve is intact, with strong symmetric movements.    Voice and Breathing - The patient was breathing comfortably without the use of accessory muscles. There was no wheezing, stridor, or stertor.  The patients voice was clear and strong, and had appropriate pitch and quality.    Ears - Bilateral pinna and EACs with normal appearing overlying skin. Tympanic membrane intact with good mobility on pneumatic otoscopy  bilaterally. Bony landmarks of the ossicular chain are normal. The tympanic membranes are normal in appearance. No retraction, perforation, or masses.  No fluid or purulence was seen in the external canal or the middle ear.     Eyes - Extraocular movements intact.  Sclera were not icteric or injected, conjunctiva were pink and moist.    Mouth - Examination of the oral cavity showed pink, healthy oral mucosa. No lesions or ulcerations noted.  The tongue was mobile and midline, and the dentition were in good condition.      Throat - The walls of the oropharynx were smooth, pink, moist, symmetric, and had no lesions or ulcerations.  The tonsillar pillars and soft palate were symmetric.  The uvula was midline on elevation.    Neck - Normal midline excursion of the laryngotracheal complex during swallowing.  Full range of motion on passive movement.  Palpation of the occipital, submental, submandibular, internal jugular chain, and supraclavicular nodes did not demonstrate any abnormal lymph nodes or masses.  The carotid pulse was palpable bilaterally.  Palpation of the thyroid was soft and smooth, with no nodules or goiter appreciated.  The trachea was mobile and midline.    Nose - External contour is symmetric, no gross deflection or scars.  His nares are somewhat narrow at the bottom with wide base of the columella.  Nasal mucosa is pink and moist with no abnormal mucus.  The septum was somewhat deviated posteriorly to the right and obstructive, turbinates of large size and position.  No polyps, masses, or purulence noted on examination.        Performed in clinic today:  To further evaluate the nasal cavity, I performed rigid nasal endoscopy.  I first sprayed the nasal cavity bilaterally with a mix of lidocaine and neosynephrine.  I then began on the left side using a 2.7mm, 30 degree rigid nasal endoscope.  The septum was deviated and the nasal airway was open.  No abnormal secretions, purulence, or polyps were  noted. The left middle turbinate and middle meatus were clearly visualized and normal in appearance.  Looking up, the olfactory cleft was unobstructed.  Going further back, the sphenoethmoid recess was normal in appearance, with healthy appearing mucosa on the face of the sphenoid.  The nasopharynx was unremarkable, and the eustachian tube opening on this side was unobstructed.    I then turned my attention to the right side.  Once again, the septum was deviated, and the airway was partially obstructed.  Obstruction mostly involves a spur running along the posterior inferior aspect of the septum.  No abnormal secretions, purulence, polyps were noted.  The right middle turbinate and middle meatus were clearly visualized and normal in appearance.  Looking up, the olfactory cleft was unobstructed.  Going further back the right sphenoethmoid recess was normal in appearance, and eustachian tube opening was unobstructed.   Purple - [id number] Mytamed    Tenderness of magnified speculum first on the right and the left side using cerumen curette alligator forceps I removed large amount of cerumen.  Tympanic membrane on the right appears to be clear mobile to insufflation.  On the left also cerumen disimpaction was performed in the same fashion under the guidance of magnified speculum.  Patient tolerated procedure well.  Audiologic Studies - An audiogram and tympanogram were performed today as part of the evaluation and personally reviewed. The tympanogram shows normal Type A curves, with normal canal volumes and middle ear pressures.  There is no sign of eustachian tube dysfunction or middle ear effusion.  The audiogram was also normal.  The sensorineural hearing was age-appropriate, with no evidence of conductive hearing loss or significant asymmetry.      ASSESSMENT/PLAN:  Guillermo Terrell is a 25 year old male with with a nasal obstruction primarily due to some structural issues including the base of the columella narrowing  the nares as well as deviated septum more vomer and maxillary crest associated spur rather than cartilage.  There is also concerned of some possibility of chronic sinusitis due to intermittent issues with the maxillary sinus pressure and sensitivity of the upper teeth in spite of the fact no other symptoms were present.  In regard to his ears certainly hearing protection as discussed in the context of measurable normal hearing but present tinnitus.  Tinnitus coping strategies were also discussed.  At this point in regard to his nose will obtain CT of the sinuses to further evaluate and see if that remains part of the puzzle needs to be addressed as well.  We will have a virtual visit afterwards to discuss the results with the patient.  Weight beginning to discuss possibility of septoplasty using a Nathanael Tract balloon as a conservative measure for the maxillary crest spur as well as submucous resection of turbinates and restructuring the base of the columella to widen the nares as part of the septoplasty procedure.    Timur Triana MD        Again, thank you for allowing me to participate in the care of your patient.        Sincerely,        Timur Triana MD, MD

## 2020-12-14 ENCOUNTER — HOSPITAL ENCOUNTER (OUTPATIENT)
Dept: CT IMAGING | Facility: CLINIC | Age: 25
Discharge: HOME | End: 2020-12-14
Attending: OTOLARYNGOLOGY | Admitting: OTOLARYNGOLOGY
Payer: COMMERCIAL

## 2020-12-14 DIAGNOSIS — J32.8 OTHER CHRONIC SINUSITIS: ICD-10-CM

## 2020-12-14 PROCEDURE — 70486 CT MAXILLOFACIAL W/O DYE: CPT

## 2020-12-28 ENCOUNTER — VIRTUAL VISIT (OUTPATIENT)
Dept: OTOLARYNGOLOGY | Facility: CLINIC | Age: 25
End: 2020-12-28
Payer: COMMERCIAL

## 2020-12-28 ENCOUNTER — PREP FOR PROCEDURE (OUTPATIENT)
Dept: OTOLARYNGOLOGY | Facility: CLINIC | Age: 25
End: 2020-12-28

## 2020-12-28 DIAGNOSIS — J34.3 HYPERTROPHY OF BOTH INFERIOR NASAL TURBINATES: ICD-10-CM

## 2020-12-28 DIAGNOSIS — J34.2 DEVIATED NASAL SEPTUM: Primary | ICD-10-CM

## 2020-12-28 DIAGNOSIS — J31.0 CHRONIC RHINITIS: ICD-10-CM

## 2020-12-28 PROCEDURE — 99213 OFFICE O/P EST LOW 20 MIN: CPT | Mod: TEL | Performed by: OTOLARYNGOLOGY

## 2020-12-28 NOTE — LETTER
"    12/28/2020         RE: Guillermo Terrell  [address] [place] [address]        Dear Colleague,    Thank you for referring your patient, Guillermo Terrell, to the [facility]. Please see a copy of my visit note below.    Guillermo Terrell is a 25 year old male who is being evaluated via a billable telephone visit.      The patient has been notified of following:     \"This telephone visit will be conducted via a call between you and your physician/provider. We have found that certain health care needs can be provided without the need for a physical exam.  This service lets us provide the care you need with a short phone conversation.  If a prescription is necessary we can send it directly to your pharmacy.  If lab work is needed we can place an order for that and you can then stop by our lab to have the test done at a later time.    Telephone visits are billed at different rates depending on your insurance coverage. During this emergency period, for some insurers they may be billed the same as an in-person visit.  Please reach out to your insurance provider with any questions.    If during the course of the call the physician/provider feels a telephone visit is not appropriate, you will not be charged for this service.\"    Patient has given verbal consent for Telephone visit?  Yes    What phone number would you like to be contacted at? [phone]    How would you like to obtain your AVS? Mail a copy    Phone call duration: 15 minutes    Patient presented to us with main complaints of congestion clear discharge anteriorly mostly postnasally.  There was concern about his sinuses being one of the sources and the CT of the sinuses was obtained.  The results are accounted for below.    CT SCAN OF THE PARANASAL SINUSES AND FACE  12/14/2020 3:14 PM      HISTORY: Sinusitis, acute recurring, possible surgery. Other chronic  sinusitis.     TECHNIQUE: Noncontrast axial scans of the sinuses with " coronal and  sagittal reformations were completed. Radiation dose for this scan was  reduced using automated exposure control, adjustment of the mA and/or  kV according to patient size, or iterative reconstruction technique.       COMPARISON: None.     FINDINGS:   Frontal sinuses: No significant mucosal thickening. The frontal sinus  drainage pathways are clear.      Ethmoid sinuses: Trace right-sided mucosal thickening.      Right maxillary sinus: No significant mucosal thickening. The  ostiomeatal unit is normal with a patent infundibulum.      Left maxillary sinus: No significant mucosal thickening. The  ostiomeatal unit is normal with a patent infundibulum.      Sphenoid sinus: No significant mucosal thickening. The sphenoid sinus  ostia and sphenoethmoidal recesses are clear.      Nasal septum: Near midline with small right septal spur.      Turbinates and nasal cavity: No nasal mucosal thickening. The  turbinates are unremarkable.      Laminae papyracea and cribriform plate: The left fovea ethmoidalis  approximately 1 mm lower compared to the right. Otherwise  unremarkable.     Other findings: Low mAs images of the brain are unremarkable. No  suspicious lucencies around the visualized teeth.                                                                       IMPRESSION:   1. No significant mucosal thickening.  2. Patent drainage pathways.     His symptoms still continue now that we know that sinuses and not to blame for his drainage issues.  Considering no other specific factors that she has allergies have been noted we discussed again structural issues that is septal deviation as well as inferior turbinate hypertrophy.  We again discussed submucous resection of turbinates as an option considering he did not do well with conservative topical sprays.  We also discussed the conservative septal procedure with a endoscopically guided septoplasty of targeted removal of septal spurs as well as use of septal  Acclerent balloon. We discuss risks and possible complication of septoplasty including septal perforation, bleeding(that may require packing), infection, loss of smell, stenosis, CSF rhinorrhea. With this knowledge the patient wishes to seriously consider surgery and let us know about his decisions.    Timur Triana MD      Again, thank you for allowing me to participate in the care of your patient.        Sincerely,        Timur Triana MD, MD

## 2020-12-28 NOTE — PROGRESS NOTES
"Guillermo Terrell is a 25 year old male who is being evaluated via a billable telephone visit.      The patient has been notified of following:     \"This telephone visit will be conducted via a call between you and your physician/provider. We have found that certain health care needs can be provided without the need for a physical exam.  This service lets us provide the care you need with a short phone conversation.  If a prescription is necessary we can send it directly to your pharmacy.  If lab work is needed we can place an order for that and you can then stop by our lab to have the test done at a later time.    Telephone visits are billed at different rates depending on your insurance coverage. During this emergency period, for some insurers they may be billed the same as an in-person visit.  Please reach out to your insurance provider with any questions.    If during the course of the call the physician/provider feels a telephone visit is not appropriate, you will not be charged for this service.\"    Patient has given verbal consent for Telephone visit?  Yes    What phone number would you like to be contacted at? [phone]    How would you like to obtain your AVS? Mail a copy    Phone call duration: 15 minutes    Patient presented to us with main complaints of congestion clear discharge anteriorly mostly postnasally.  There was concern about his sinuses being one of the sources and the CT of the sinuses was obtained.  The results are accounted for below.    CT SCAN OF THE PARANASAL SINUSES AND FACE  12/14/2020 3:14 PM      HISTORY: Sinusitis, acute recurring, possible surgery. Other chronic  sinusitis.     TECHNIQUE: Noncontrast axial scans of the sinuses with coronal and  sagittal reformations were completed. Radiation dose for this scan was  reduced using automated exposure control, adjustment of the mA and/or  kV according to patient size, or iterative reconstruction technique.       COMPARISON: " None.     FINDINGS:   Frontal sinuses: No significant mucosal thickening. The frontal sinus  drainage pathways are clear.      Ethmoid sinuses: Trace right-sided mucosal thickening.      Right maxillary sinus: No significant mucosal thickening. The  ostiomeatal unit is normal with a patent infundibulum.      Left maxillary sinus: No significant mucosal thickening. The  ostiomeatal unit is normal with a patent infundibulum.      Sphenoid sinus: No significant mucosal thickening. The sphenoid sinus  ostia and sphenoethmoidal recesses are clear.      Nasal septum: Near midline with small right septal spur.      Turbinates and nasal cavity: No nasal mucosal thickening. The  turbinates are unremarkable.      Laminae papyracea and cribriform plate: The left fovea ethmoidalis  approximately 1 mm lower compared to the right. Otherwise  unremarkable.     Other findings: Low mAs images of the brain are unremarkable. No  suspicious lucencies around the visualized teeth.                                                                       IMPRESSION:   1. No significant mucosal thickening.  2. Patent drainage pathways.     His symptoms still continue now that we know that sinuses and not to blame for his drainage issues.  Considering no other specific factors that she has allergies have been noted we discussed again structural issues that is septal deviation as well as inferior turbinate hypertrophy.  We again discussed submucous resection of turbinates as an option considering he did not do well with conservative topical sprays.  We also discussed the conservative septal procedure with a endoscopically guided septoplasty of targeted removal of septal spurs as well as use of septal Acclerent balloon. We discuss risks and possible complication of septoplasty including septal perforation, bleeding(that may require packing), infection, loss of smell, stenosis, CSF rhinorrhea. With this knowledge the patient wishes to seriously  consider surgery and let us know about his decisions.    Timur Triana MD

## 2020-12-29 ENCOUNTER — TELEPHONE (OUTPATIENT)
Dept: OTOLARYNGOLOGY | Facility: CLINIC | Age: 25
End: 2020-12-29

## 2020-12-29 PROBLEM — J34.3 HYPERTROPHY OF BOTH INFERIOR NASAL TURBINATES: Status: ACTIVE | Noted: 2020-12-29

## 2020-12-29 PROBLEM — J34.2 DEVIATED NASAL SEPTUM: Status: ACTIVE | Noted: 2020-12-29

## 2020-12-29 NOTE — TELEPHONE ENCOUNTER
Type of surgery: septoplasty with submucous resection of turbinates  Location of surgery: [facility]   Date of surgery: 5/11/21  Surgeon: Dr. Triana  Pre-Op Appt Date: 5/7/21  Post-Op Appt Date: 5/19/21   Packet sent out: Surgery packet mailed to patient's home address.   Pre-cert/Authorization completed: NA  Date: 12/29/2020    Sintia Cantu  Surgery Scheduler

## 2021-02-09 ENCOUNTER — TELEPHONE (OUTPATIENT)
Dept: PEDIATRICS | Facility: OTHER | Age: 26
End: 2021-02-09

## 2021-02-09 NOTE — TELEPHONE ENCOUNTER
I have not seen patient since November, recommend visit or he can see if ENT wants to prescribe.     BEAU MonrealC

## 2021-02-09 NOTE — TELEPHONE ENCOUNTER
Patient is calling and requesting an antibiotic for a sinus infection. He saw Tiny back in November and has been seeing ENT for his symptoms. Symptoms include nasal congestion, head pressure, sinus pressure, and runny nose.He has surgery scheduled to repair his deviated nasal septum on 05/11/21.     Writer informed the patient that we usually have a patient come in to be re-evaluated if their symptoms don't improved. The patient stated he has surgery scheduled to correct the problem.     PLAN:   Huddle with provider.   Patient uses Caitlin Montanez RN, BSN  [place]  February 9, 2021

## 2021-02-10 ENCOUNTER — VIRTUAL VISIT (OUTPATIENT)
Dept: OTOLARYNGOLOGY | Facility: OTHER | Age: 26
End: 2021-02-10
Payer: COMMERCIAL

## 2021-02-10 DIAGNOSIS — J01.90 ACUTE SINUSITIS WITH SYMPTOMS > 10 DAYS: Primary | ICD-10-CM

## 2021-02-10 PROCEDURE — 99213 OFFICE O/P EST LOW 20 MIN: CPT | Mod: GT | Performed by: OTOLARYNGOLOGY

## 2021-02-10 RX ORDER — CLARITHROMYCIN 500 MG
500 TABLET ORAL 2 TIMES DAILY
Qty: 20 TABLET | Refills: 0 | Status: SHIPPED | OUTPATIENT
Start: 2021-02-10 | End: 2021-02-20

## 2021-02-10 RX ORDER — FLUTICASONE PROPIONATE 50 MCG
1 SPRAY, SUSPENSION (ML) NASAL DAILY
COMMUNITY

## 2021-02-10 NOTE — PROGRESS NOTES
Guillermo is a 25 year old who is being evaluated via a billable video visit.      How would you like to obtain your AVS? MyChart  If the video visit is dropped, the invitation should be resent by: Text to cell phone: [phone]  Will anyone else be joining your video visit? No        Video-Visit Details    Type of service:  Video Visit    Video End Time:2pm    Originating Location (pt. Location): Home    Distant Location (provider location):  [facility] [employer]     Platform used for Video Visit: [facility]     History of Present Illness - Guillermo Terrell is a 25 year old male presenting in clinic today for a recheck on Patient presents with:  Sinusitis    Patient with a history of recurrent sinusitis has had 4 in the last year recently again has been battling facial pressure discomfort clear to yellowish discharge congestion.  He is currently scheduled for septoplasty submucous resection of turbinates because of nasal obstruction due to deviated septum enlarged turbinates not responding to medical therapy.  He is not taking any current medications for the sinus infection symptomatology that has been ongoing for the last couple weeks.      There is no height or weight on file to calculate BMI.        BP Readings from Last 1 Encounters:   12/09/20 130/80           Guillermo IS a smoker/uses chewing tobacco.  Guillermo is ready to quit      Past Medical History -   Past Medical History:   Diagnosis Date     Lyme disease 2000       Current Medications -   Current Outpatient Medications:      fluticasone (FLONASE) 50 MCG/ACT nasal spray, Spray 1 spray into both nostrils daily, Disp: , Rfl:      Pseudoephedrine-guaiFENesin (MUCINEX D PO), , Disp: , Rfl:      Acetaminophen (TYLENOL PO), Take  by mouth., Disp: , Rfl:      ibuprofen (ADVIL,MOTRIN) 800 MG tablet, Take 800 mg by mouth every 8 hours as needed., Disp: , Rfl:      methocarbamol (ROBAXIN) 500 MG tablet, Take 500 mg by mouth as needed, Disp: , Rfl:      Allergies -   Allergies   Allergen Reactions     Lorabid [Cephalosporins] Rash     When he was around [age] year old.       Social History -   Social History     Socioeconomic History     Marital status: Single     Spouse name: None     Number of children: None     Years of education: None     Highest education level: None   Occupational History     None   Social Needs     Financial resource strain: None     Food insecurity     Worry: None     Inability: None     Transportation needs     Medical: None     Non-medical: None   Tobacco Use     Smoking status: Former Smoker     Types: Cigarettes     Smokeless tobacco: Current User     Types: Chew     Tobacco comment: sometimes   Substance and Sexual Activity     Alcohol use: Yes     Drug use: Not Currently     Types: Marijuana     Comment: not for over a year 11/24/2020     Sexual activity: Yes   Lifestyle     Physical activity     Days per week: None     Minutes per session: None     Stress: None   Relationships     Social connections     Talks on phone: None     Gets together: None     Attends Zoroastrianism service: None     Active member of club or organization: None     Attends meetings of clubs or organizations: None     Relationship status: None     Intimate partner violence     Fear of current or ex partner: None     Emotionally abused: None     Physically abused: None     Forced sexual activity: None   Other Topics Concern      Service Not Asked     Blood Transfusions Not Asked     Caffeine Concern Not Asked     Occupational Exposure Not Asked     Hobby Hazards Not Asked     Sleep Concern Not Asked     Stress Concern Not Asked     Weight Concern Not Asked     Special Diet Not Asked     Back Care Not Asked     Exercise Not Asked     Bike Helmet Not Asked     Seat Belt Not Asked     Self-Exams Not Asked   Social History Narrative     None       Family History -   Family History   Problem Relation Age of Onset     Breast Cancer Maternal Grandmother       Circulatory Paternal Grandmother         Annuersym     Diabetes Mother         Type 1     Diabetes Other         paternal cousin Type 1       Review of Systems - As per HPI and PMHx, otherwise review of system review of the head and neck negative. Otherwise 10+ review of system is negative    Physical Exam  There were no vitals taken for this visit.  BMI: There is no height or weight on file to calculate BMI.    General - The patient is well nourished and well developed, and appears to have good nutritional status.  Alert and oriented to person and place, answers questions and cooperates with examination appropriately.    SKIN - No suspicious lesions or rashes.  Respiration - No respiratory distress.  Head and Face - Normocephalic and atraumatic, with no gross asymmetry noted of the contour of the facial features.  The facial nerve is intact, with strong symmetric movements.    Voice and Breathing - The patient was breathing comfortably without the use of accessory muscles. The patients voice was clear and strong, and had appropriate pitch and quality.    Neuro - Nonfocal neuro exam is normal, CN 2 through 12 intact, normal gait and muscle tone.      Performed in clinic today:  No procedures preformed in clinic today      A/P - Guillermo Terrell is a 25 year old male Patient presents with:  Sinusitis    Patient presented for a video virtual visit today due to symptomatology with other recurrence of sinusitis.  Reviewing his CT scan with him today show that he has a deviated septum to the right large inferior turbinates pressure on the left but ostiomeatal units otherwise are patent.  The CT scan was done in December 2020.  There was no evidence of chronic sinusitis sinuses well pneumatized developed clear.  At this point therefore we will initiate treatment for acute sinusitis with clarithromycin 500 mg twice daily since Augmentin this time was not very effective.  He will use local heat decongestants as needed and  ibuprofen as needed.  Assuming the symptoms improve and respond to therapy he will be seen the day of septoplasty surgery.  To the 22 minutes was spent with the patient in virtual counseling consulting on the video.    Timur Triana MD

## 2021-02-10 NOTE — LETTER
2/10/2021         RE: Guillermo Terrell  [address] [place] [address]        Dear Colleague,    Thank you for referring your patient, Guillermo Terrell, to the [facility]. Please see a copy of my visit note below.    Guillermo is a 25 year old who is being evaluated via a billable video visit.      How would you like to obtain your AVS? MyChart  If the video visit is dropped, the invitation should be resent by: Text to cell phone: [phone]  Will anyone else be joining your video visit? No        Video-Visit Details    Type of service:  Video Visit    Video End Time:2pm    Originating Location (pt. Location): Home    Distant Location (provider location):  [facility]     Platform used for Video Visit: [employer]     History of Present Illness - Guillermo Terrell is a 25 year old male presenting in clinic today for a recheck on Patient presents with:  Sinusitis    Patient with a history of recurrent sinusitis has had 4 in the last year recently again has been battling facial pressure discomfort clear to yellowish discharge congestion.  He is currently scheduled for septoplasty submucous resection of turbinates because of nasal obstruction due to deviated septum enlarged turbinates not responding to medical therapy.  He is not taking any current medications for the sinus infection symptomatology that has been ongoing for the last couple weeks.      There is no height or weight on file to calculate BMI.        BP Readings from Last 1 Encounters:   12/09/20 130/80           Guillermo IS a smoker/uses chewing tobacco.  Guillermo is ready to quit      Past Medical History -   Past Medical History:   Diagnosis Date     Lyme disease 2000       Current Medications -   Current Outpatient Medications:      fluticasone (FLONASE) 50 MCG/ACT nasal spray, Spray 1 spray into both nostrils daily, Disp: , Rfl:      Pseudoephedrine-guaiFENesin (MUCINEX D PO), , Disp: , Rfl:       Acetaminophen (TYLENOL PO), Take  by mouth., Disp: , Rfl:      ibuprofen (ADVIL,MOTRIN) 800 MG tablet, Take 800 mg by mouth every 8 hours as needed., Disp: , Rfl:      methocarbamol (ROBAXIN) 500 MG tablet, Take 500 mg by mouth as needed, Disp: , Rfl:     Allergies -   Allergies   Allergen Reactions     Lorabid [Cephalosporins] Rash     When he was around [age] year old.       Social History -   Social History     Socioeconomic History     Marital status: Single     Spouse name: None     Number of children: None     Years of education: None     Highest education level: None   Occupational History     None   Social Needs     Financial resource strain: None     Food insecurity     Worry: None     Inability: None     Transportation needs     Medical: None     Non-medical: None   Tobacco Use     Smoking status: Former Smoker     Types: Cigarettes     Smokeless tobacco: Current User     Types: Chew     Tobacco comment: sometimes   Substance and Sexual Activity     Alcohol use: Yes     Drug use: Not Currently     Types: Marijuana     Comment: not for over a year 11/24/2020     Sexual activity: Yes   Lifestyle     Physical activity     Days per week: None     Minutes per session: None     Stress: None   Relationships     Social connections     Talks on phone: None     Gets together: None     Attends Roman Catholic service: None     Active member of club or organization: None     Attends meetings of clubs or organizations: None     Relationship status: None     Intimate partner violence     Fear of current or ex partner: None     Emotionally abused: None     Physically abused: None     Forced sexual activity: None   Other Topics Concern      Service Not Asked     Blood Transfusions Not Asked     Caffeine Concern Not Asked     Occupational Exposure Not Asked     Hobby Hazards Not Asked     Sleep Concern Not Asked     Stress Concern Not Asked     Weight Concern Not Asked     Special Diet Not Asked     Back Care Not Asked      Exercise Not Asked     Bike Helmet Not Asked     Seat Belt Not Asked     Self-Exams Not Asked   Social History Narrative     None       Family History -   Family History   Problem Relation Age of Onset     Breast Cancer Maternal Grandmother      Circulatory Paternal Grandmother         Annuersym     Diabetes Mother         Type 1     Diabetes Other         paternal cousin Type 1       Review of Systems - As per HPI and PMHx, otherwise review of system review of the head and neck negative. Otherwise 10+ review of system is negative    Physical Exam  There were no vitals taken for this visit.  BMI: There is no height or weight on file to calculate BMI.    General - The patient is well nourished and well developed, and appears to have good nutritional status.  Alert and oriented to person and place, answers questions and cooperates with examination appropriately.    SKIN - No suspicious lesions or rashes.  Respiration - No respiratory distress.  Head and Face - Normocephalic and atraumatic, with no gross asymmetry noted of the contour of the facial features.  The facial nerve is intact, with strong symmetric movements.    Voice and Breathing - The patient was breathing comfortably without the use of accessory muscles. The patients voice was clear and strong, and had appropriate pitch and quality.    Neuro - Nonfocal neuro exam is normal, CN 2 through 12 intact, normal gait and muscle tone.      Performed in clinic today:  No procedures preformed in clinic today      A/P - Guillermo Terrell is a 25 year old male Patient presents with:  Sinusitis    Patient presented for a video virtual visit today due to symptomatology with other recurrence of sinusitis.  Reviewing his CT scan with him today show that he has a deviated septum to the right large inferior turbinates pressure on the left but ostiomeatal units otherwise are patent.  The CT scan was done in December 2020.  There was no evidence of chronic sinusitis sinuses well  pneumatized developed clear.  At this point therefore we will initiate treatment for acute sinusitis with clarithromycin 500 mg twice daily since Augmentin this time was not very effective.  He will use local heat decongestants as needed and ibuprofen as needed.  Assuming the symptoms improve and respond to therapy he will be seen the day of septoplasty surgery.  To the 22 minutes was spent with the patient in virtual counseling consulting on the video.    Timur Triana MD          Again, thank you for allowing me to participate in the care of your patient.        Sincerely,        Timur Triana MD, MD

## 2021-02-11 NOTE — TELEPHONE ENCOUNTER
Last read by Guillermo Terrell at 8:58 AM on 2/10/2021.    Patient saw ENT 2/10  Shannan Parsons MA

## 2021-03-13 ENCOUNTER — HEALTH MAINTENANCE LETTER (OUTPATIENT)
Age: 26
End: 2021-03-13

## 2021-04-28 DIAGNOSIS — Z11.59 ENCOUNTER FOR SCREENING FOR OTHER VIRAL DISEASES: ICD-10-CM

## 2021-05-04 NOTE — PROGRESS NOTES
[facility]  [address]  [place] [address]  Phone: [phone]  Primary Provider: Olivia Escamilla  Pre-op Performing Provider: CHICO RIVERO      PREOPERATIVE EVALUATION:  Today's date: 5/7/2021    Guillermo Terrell is a 25 year old male who presents for a preoperative evaluation.    Surgical Information:  Surgery/Procedure: SEPTOPLASTY, NOSE, WITH submucous resection of turbinates, ENDOSCOPIC BALLOON SINUPLASTY ACCLARENT  Surgery Location: [facility]  Surgeon: Timur Triana MD  Surgery Date: 5/11/2021  Time of Surgery: 7:30 am  Where patient plans to recover: At home with family  Fax number for surgical facility: Note does not need to be faxed, will be available electronically in Epic.    Type of Anesthesia Anticipated: General    Assessment & Plan     The proposed surgical procedure is considered LOW risk.      ICD-10-CM    1. Preop general physical exam  Z01.818    2. Chronic maxillary sinusitis  J32.0    3. Deviated nasal septum  J34.2    4. Hypertrophy of both inferior nasal turbinates  J34.3    5. Midline low back pain without sciatica, unspecified chronicity  M54.5 methocarbamol (ROBAXIN) 500 MG tablet   6. Elevated BP without diagnosis of hypertension  R03.0    7. Chewing tobacco nicotine dependence without complication  F17.220        1-4. Cleared for surgery. Patient is not on any medications that he needs to hold prior to surgery.    5. Stable. Utilizes methocarbamol as needed for back pain. Will refill to use as needed.    6. BP in clinic today was 130/90, recheck was 142/86 but he had an energy drink 1 hour ago. Discussed with patient checking his BP regularly to make sure it is less than 140/90. Encouraged low salt diet, healthy dietary intake, and regular exercise for weight management. Recommend he cut back on the energy drinks.    7. Discussed the importance of quitting chewing along with intermittent cigar  use. I discussed trying non-tobacco chew options like Grinds or [place].       Risks and Recommendations:  The patient has the following additional risks and recommendations for perioperative complications:   - No identified additional risk factors other than previously addressed    Medication Instructions:  Patient is on no chronic medications    RECOMMENDATION:  APPROVAL GIVEN to proceed with proposed procedure, without further diagnostic evaluation.    Review of external notes as documented above   [id number]}    Subjective     HPI related to upcoming procedure:   Patient presents for preoperative evaluation prior to septoplasty on 5/11/21 by Dr. Triana. Patient has tolerated general anesthesia well previously. No family history of malignant hyperthermia or cardiovascular disease. He is on no medications that he needs to hold prior to surgery.       Preop Questions 5/7/2021   1. Have you ever had a heart attack or stroke? No   2. Have you ever had surgery on your heart or blood vessels, such as a stent placement, a coronary artery bypass, or surgery on an artery in your head, neck, heart, or legs? No   3. Do you have chest pain with activity? No   4. Do you have a history of  heart failure? No   5. Do you currently have a cold, bronchitis or symptoms of other infection? No   6. Do you have a cough, shortness of breath, or wheezing? No   7. Do you or anyone in your family have previous history of blood clots? No   8. Do you or does anyone in your family have a serious bleeding problem such as prolonged bleeding following surgeries or cuts? No   9. Have you ever had problems with anemia or been told to take iron pills? No   10. Have you had any abnormal blood loss such as black, tarry or bloody stools? No   11. Have you ever had a blood transfusion? No   12. Are you willing to have a blood transfusion if it is medically needed before, during, or after your surgery? Yes   13. Have you or any of your relatives  ever had problems with anesthesia? No   14. Do you have sleep apnea, excessive snoring or daytime drowsiness? No   15. Do you have any artifical heart valves or other implanted medical devices like a pacemaker, defibrillator, or continuous glucose monitor? No   16. Do you have artificial joints? No   17. Are you allergic to latex? No     Health Care Directive:  Patient does not have a Health Care Directive or Living Will: Discussed advance care planning with patient; however, patient declined at this time.    Status of Chronic Conditions:  See problem list for active medical problems.  Problems all longstanding and stable, except as noted/documented.  See ROS for pertinent symptoms related to these conditions.      Review of Systems  CONSTITUTIONAL: NEGATIVE for fever, chills, change in weight  INTEGUMENTARY/SKIN: NEGATIVE for worrisome rashes, moles or lesions  EYES: NEGATIVE for vision changes or irritation  ENT/MOUTH: +Chronic nasal/sinus congestion and nasal airway obstruction. NEGATIVE for ear, mouth and throat problems  RESP: NEGATIVE for significant cough or SOB  BREAST: NEGATIVE for masses, tenderness or discharge  CV: NEGATIVE for chest pain, palpitations or peripheral edema  GI: NEGATIVE for nausea, abdominal pain, heartburn, or change in bowel habits  : NEGATIVE for frequency, dysuria, or hematuria  MUSCULOSKELETAL: NEGATIVE for significant arthralgias or myalgia  NEURO: NEGATIVE for weakness, dizziness or paresthesias  ENDOCRINE: NEGATIVE for temperature intolerance, skin/hair changes  HEME: NEGATIVE for bleeding problems  PSYCHIATRIC: NEGATIVE for changes in mood or affect    Patient Active Problem List    Diagnosis Date Noted     Deviated nasal septum 12/29/2020     Priority: Medium     Added automatically from request for surgery [id number]       Hypertrophy of both inferior nasal turbinates 12/29/2020     Priority: Medium     Added automatically from request for surgery [id number]       Seasonal allergic  "rhinitis due to pollen 12/01/2020     Priority: Medium     Elevated blood pressure reading without diagnosis of hypertension 12/16/2019     Priority: Medium     Hyperhidrosis of axilla 03/06/2015     Priority: Medium     Attention deficit hyperactivity disorder (ADHD), predominantly inattentive type 05/03/2011     Priority: Medium     Acne 01/18/2011     Priority: Medium     Obesity 11/02/2005     Priority: Medium     Epic        Past Medical History:   Diagnosis Date     Lyme disease 2000     Past Surgical History:   Procedure Laterality Date     TONSILLECTOMY & ADENOIDECTOMY  12/06     Current Outpatient Medications   Medication Sig Dispense Refill     Acetaminophen (TYLENOL PO) Take  by mouth.       fluticasone (FLONASE) 50 MCG/ACT nasal spray Spray 1 spray into both nostrils daily       methocarbamol (ROBAXIN) 500 MG tablet Take 1 tablet (500 mg) by mouth as needed for muscle spasms 20 tablet 0     Pseudoephedrine-guaiFENesin (MUCINEX D PO)          Allergies   Allergen Reactions     Lorabid [Cephalosporins] Rash     When he was around [age] year old.        Social History     Tobacco Use     Smoking status: Former Smoker     Types: Cigarettes     Smokeless tobacco: Current User     Types: Chew     Tobacco comment: sometimes   Substance Use Topics     Alcohol use: Yes       History   Drug Use Unknown     Comment: not for over a year 11/24/2020         Objective     BP (!) 142/86   Pulse 59   Temp 98  F (36.7  C) (Temporal)   Ht 1.918 m (6' 3.5\")   Wt 135.4 kg (298 lb 6.4 oz)   SpO2 98%   BMI 36.81 kg/m      Physical Exam    GENERAL APPEARANCE: healthy, alert and no distress     EYES: EOMI,  PERRL     HENT: ear canals and TM's normal and nose and mouth without ulcers or lesions     NECK: no adenopathy, no asymmetry, masses, or scars and thyroid normal to palpation     RESP: lungs clear to auscultation - no rales, rhonchi or wheezes     CV: regular rate and rhythm, normal S1 S2, no S3 or S4 and no murmur, " click or rub     ABDOMEN:  soft, nontender, no HSM or masses and bowel sounds normal     MS: extremities normal- no gross deformities noted, no evidence of inflammation in joints, FROM in all extremities.     SKIN: no suspicious lesions or rashes     NEURO: Normal strength and tone, sensory exam grossly normal, mentation intact and speech normal. Gait is stable.     PSYCH: mentation appears normal. and affect normal/bright     LYMPHATICS: No cervical adenopathy    No results for input(s): HGB, PLT, INR, NA, POTASSIUM, CR, A1C in the last [id number] hours.     Diagnostics:  No labs were ordered during this visit.   No EKG required due to no history of cardiovascular disease.     Revised Cardiac Risk Index (RCRI):  The patient has the following serious cardiovascular risks for perioperative complications:   - No serious cardiac risks = 0 points     RCRI Interpretation: 0 points: Class I (very low risk - 0.4% complication rate)      Patient seen in conjunction with BRITANY Noland.     Signed Electronically by: Ross Best PA-C  Copy of this evaluation report is provided to requesting physician.

## 2021-05-04 NOTE — H&P (VIEW-ONLY)
[facility]  [address]  [facility] [address]  Phone: [phone]  Primary Provider: Olivia Escamilla  Pre-op Performing Provider: CHICO RIVERO      PREOPERATIVE EVALUATION:  Today's date: 5/7/2021    Guillermo Terrell is a 25 year old male who presents for a preoperative evaluation.    Surgical Information:  Surgery/Procedure: SEPTOPLASTY, NOSE, WITH submucous resection of turbinates, ENDOSCOPIC BALLOON SINUPLASTY ACCLARENT  Surgery Location: [facility]  Surgeon: Timur Triana MD  Surgery Date: 5/11/2021  Time of Surgery: 7:30 am  Where patient plans to recover: At home with family  Fax number for surgical facility: Note does not need to be faxed, will be available electronically in Epic.    Type of Anesthesia Anticipated: General    Assessment & Plan     The proposed surgical procedure is considered LOW risk.      ICD-10-CM    1. Preop general physical exam  Z01.818    2. Chronic maxillary sinusitis  J32.0    3. Deviated nasal septum  J34.2    4. Hypertrophy of both inferior nasal turbinates  J34.3    5. Midline low back pain without sciatica, unspecified chronicity  M54.5 methocarbamol (ROBAXIN) 500 MG tablet   6. Elevated BP without diagnosis of hypertension  R03.0    7. Chewing tobacco nicotine dependence without complication  F17.220        1-4. Cleared for surgery. Patient is not on any medications that he needs to hold prior to surgery.    5. Stable. Utilizes methocarbamol as needed for back pain. Will refill to use as needed.    6. BP in clinic today was 130/90, recheck was 142/86 but he had an energy drink 1 hour ago. Discussed with patient checking his BP regularly to make sure it is less than 140/90. Encouraged low salt diet, healthy dietary intake, and regular exercise for weight management. Recommend he cut back on the energy drinks.    7. Discussed the importance of quitting chewing along with intermittent cigar  use. I discussed trying non-tobacco chew options like Grinds or [place].       Risks and Recommendations:  The patient has the following additional risks and recommendations for perioperative complications:   - No identified additional risk factors other than previously addressed    Medication Instructions:  Patient is on no chronic medications    RECOMMENDATION:  APPROVAL GIVEN to proceed with proposed procedure, without further diagnostic evaluation.    Review of external notes as documented above   [id number]}    Subjective     HPI related to upcoming procedure:   Patient presents for preoperative evaluation prior to septoplasty on 5/11/21 by Dr. Triana. Patient has tolerated general anesthesia well previously. No family history of malignant hyperthermia or cardiovascular disease. He is on no medications that he needs to hold prior to surgery.       Preop Questions 5/7/2021   1. Have you ever had a heart attack or stroke? No   2. Have you ever had surgery on your heart or blood vessels, such as a stent placement, a coronary artery bypass, or surgery on an artery in your head, neck, heart, or legs? No   3. Do you have chest pain with activity? No   4. Do you have a history of  heart failure? No   5. Do you currently have a cold, bronchitis or symptoms of other infection? No   6. Do you have a cough, shortness of breath, or wheezing? No   7. Do you or anyone in your family have previous history of blood clots? No   8. Do you or does anyone in your family have a serious bleeding problem such as prolonged bleeding following surgeries or cuts? No   9. Have you ever had problems with anemia or been told to take iron pills? No   10. Have you had any abnormal blood loss such as black, tarry or bloody stools? No   11. Have you ever had a blood transfusion? No   12. Are you willing to have a blood transfusion if it is medically needed before, during, or after your surgery? Yes   13. Have you or any of your relatives  ever had problems with anesthesia? No   14. Do you have sleep apnea, excessive snoring or daytime drowsiness? No   15. Do you have any artifical heart valves or other implanted medical devices like a pacemaker, defibrillator, or continuous glucose monitor? No   16. Do you have artificial joints? No   17. Are you allergic to latex? No     Health Care Directive:  Patient does not have a Health Care Directive or Living Will: Discussed advance care planning with patient; however, patient declined at this time.    Status of Chronic Conditions:  See problem list for active medical problems.  Problems all longstanding and stable, except as noted/documented.  See ROS for pertinent symptoms related to these conditions.      Review of Systems  CONSTITUTIONAL: NEGATIVE for fever, chills, change in weight  INTEGUMENTARY/SKIN: NEGATIVE for worrisome rashes, moles or lesions  EYES: NEGATIVE for vision changes or irritation  ENT/MOUTH: +Chronic nasal/sinus congestion and nasal airway obstruction. NEGATIVE for ear, mouth and throat problems  RESP: NEGATIVE for significant cough or SOB  BREAST: NEGATIVE for masses, tenderness or discharge  CV: NEGATIVE for chest pain, palpitations or peripheral edema  GI: NEGATIVE for nausea, abdominal pain, heartburn, or change in bowel habits  : NEGATIVE for frequency, dysuria, or hematuria  MUSCULOSKELETAL: NEGATIVE for significant arthralgias or myalgia  NEURO: NEGATIVE for weakness, dizziness or paresthesias  ENDOCRINE: NEGATIVE for temperature intolerance, skin/hair changes  HEME: NEGATIVE for bleeding problems  PSYCHIATRIC: NEGATIVE for changes in mood or affect    Patient Active Problem List    Diagnosis Date Noted     Deviated nasal septum 12/29/2020     Priority: Medium     Added automatically from request for surgery [id number]       Hypertrophy of both inferior nasal turbinates 12/29/2020     Priority: Medium     Added automatically from request for surgery [id number]       Seasonal allergic  "rhinitis due to pollen 12/01/2020     Priority: Medium     Elevated blood pressure reading without diagnosis of hypertension 12/16/2019     Priority: Medium     Hyperhidrosis of axilla 03/06/2015     Priority: Medium     Attention deficit hyperactivity disorder (ADHD), predominantly inattentive type 05/03/2011     Priority: Medium     Acne 01/18/2011     Priority: Medium     Obesity 11/02/2005     Priority: Medium     Epic        Past Medical History:   Diagnosis Date     Lyme disease 2000     Past Surgical History:   Procedure Laterality Date     TONSILLECTOMY & ADENOIDECTOMY  12/06     Current Outpatient Medications   Medication Sig Dispense Refill     Acetaminophen (TYLENOL PO) Take  by mouth.       fluticasone (FLONASE) 50 MCG/ACT nasal spray Spray 1 spray into both nostrils daily       methocarbamol (ROBAXIN) 500 MG tablet Take 1 tablet (500 mg) by mouth as needed for muscle spasms 20 tablet 0     Pseudoephedrine-guaiFENesin (MUCINEX D PO)          Allergies   Allergen Reactions     Lorabid [Cephalosporins] Rash     When he was around [age] year old.        Social History     Tobacco Use     Smoking status: Former Smoker     Types: Cigarettes     Smokeless tobacco: Current User     Types: Chew     Tobacco comment: sometimes   Substance Use Topics     Alcohol use: Yes       History   Drug Use Unknown     Comment: not for over a year 11/24/2020         Objective     BP (!) 142/86   Pulse 59   Temp 98  F (36.7  C) (Temporal)   Ht 1.918 m (6' 3.5\")   Wt 135.4 kg (298 lb 6.4 oz)   SpO2 98%   BMI 36.81 kg/m      Physical Exam    GENERAL APPEARANCE: healthy, alert and no distress     EYES: EOMI,  PERRL     HENT: ear canals and TM's normal and nose and mouth without ulcers or lesions     NECK: no adenopathy, no asymmetry, masses, or scars and thyroid normal to palpation     RESP: lungs clear to auscultation - no rales, rhonchi or wheezes     CV: regular rate and rhythm, normal S1 S2, no S3 or S4 and no murmur, " click or rub     ABDOMEN:  soft, nontender, no HSM or masses and bowel sounds normal     MS: extremities normal- no gross deformities noted, no evidence of inflammation in joints, FROM in all extremities.     SKIN: no suspicious lesions or rashes     NEURO: Normal strength and tone, sensory exam grossly normal, mentation intact and speech normal. Gait is stable.     PSYCH: mentation appears normal. and affect normal/bright     LYMPHATICS: No cervical adenopathy    No results for input(s): HGB, PLT, INR, NA, POTASSIUM, CR, A1C in the last 39983 hours.     Diagnostics:  No labs were ordered during this visit.   No EKG required due to no history of cardiovascular disease.     Revised Cardiac Risk Index (RCRI):  The patient has the following serious cardiovascular risks for perioperative complications:   - No serious cardiac risks = 0 points     RCRI Interpretation: 0 points: Class I (very low risk - 0.4% complication rate)      Patient seen in conjunction with BRITANY Noland.     Signed Electronically by: Ross Best PA-C  Copy of this evaluation report is provided to requesting physician.

## 2021-05-04 NOTE — PATIENT INSTRUCTIONS

## 2021-05-07 ENCOUNTER — OFFICE VISIT (OUTPATIENT)
Dept: FAMILY MEDICINE | Facility: OTHER | Age: 26
End: 2021-05-07
Payer: COMMERCIAL

## 2021-05-07 VITALS
HEIGHT: 76 IN | SYSTOLIC BLOOD PRESSURE: 142 MMHG | TEMPERATURE: 98 F | HEART RATE: 59 BPM | BODY MASS INDEX: 36.34 KG/M2 | WEIGHT: 298.4 LBS | OXYGEN SATURATION: 98 % | DIASTOLIC BLOOD PRESSURE: 86 MMHG

## 2021-05-07 DIAGNOSIS — F17.220 CHEWING TOBACCO NICOTINE DEPENDENCE WITHOUT COMPLICATION: ICD-10-CM

## 2021-05-07 DIAGNOSIS — J32.0 CHRONIC MAXILLARY SINUSITIS: ICD-10-CM

## 2021-05-07 DIAGNOSIS — R03.0 ELEVATED BP WITHOUT DIAGNOSIS OF HYPERTENSION: ICD-10-CM

## 2021-05-07 DIAGNOSIS — J34.3 HYPERTROPHY OF BOTH INFERIOR NASAL TURBINATES: ICD-10-CM

## 2021-05-07 DIAGNOSIS — Z01.818 PREOP GENERAL PHYSICAL EXAM: Primary | ICD-10-CM

## 2021-05-07 DIAGNOSIS — M54.50 MIDLINE LOW BACK PAIN WITHOUT SCIATICA, UNSPECIFIED CHRONICITY: ICD-10-CM

## 2021-05-07 DIAGNOSIS — Z11.59 ENCOUNTER FOR SCREENING FOR OTHER VIRAL DISEASES: ICD-10-CM

## 2021-05-07 DIAGNOSIS — J34.2 DEVIATED NASAL SEPTUM: ICD-10-CM

## 2021-05-07 LAB
SARS-COV-2 RNA RESP QL NAA+PROBE: NORMAL
SPECIMEN SOURCE: NORMAL

## 2021-05-07 PROCEDURE — U0003 INFECTIOUS AGENT DETECTION BY NUCLEIC ACID (DNA OR RNA); SEVERE ACUTE RESPIRATORY SYNDROME CORONAVIRUS 2 (SARS-COV-2) (CORONAVIRUS DISEASE [COVID-19]), AMPLIFIED PROBE TECHNIQUE, MAKING USE OF HIGH THROUGHPUT TECHNOLOGIES AS DESCRIBED BY CMS-2020-01-R: HCPCS | Performed by: OTOLARYNGOLOGY

## 2021-05-07 PROCEDURE — 99214 OFFICE O/P EST MOD 30 MIN: CPT | Performed by: PHYSICIAN ASSISTANT

## 2021-05-07 PROCEDURE — U0005 INFEC AGEN DETEC AMPLI PROBE: HCPCS | Performed by: OTOLARYNGOLOGY

## 2021-05-07 RX ORDER — METHOCARBAMOL 500 MG/1
500 TABLET, FILM COATED ORAL PRN
Qty: 20 TABLET | Refills: 0 | Status: SHIPPED | OUTPATIENT
Start: 2021-05-07 | End: 2022-04-20

## 2021-05-07 ASSESSMENT — MIFFLIN-ST. JEOR: SCORE: 2432.09

## 2021-05-08 LAB
LABORATORY COMMENT REPORT: NORMAL
SARS-COV-2 RNA RESP QL NAA+PROBE: NEGATIVE
SPECIMEN SOURCE: NORMAL

## 2021-05-10 ENCOUNTER — ANESTHESIA EVENT (OUTPATIENT)
Dept: SURGERY | Facility: CLINIC | Age: 26
End: 2021-05-10
Payer: COMMERCIAL

## 2021-05-10 ASSESSMENT — LIFESTYLE VARIABLES: TOBACCO_USE: 1

## 2021-05-11 ENCOUNTER — ANESTHESIA (OUTPATIENT)
Dept: SURGERY | Facility: CLINIC | Age: 26
End: 2021-05-11
Payer: COMMERCIAL

## 2021-05-11 ENCOUNTER — HOSPITAL ENCOUNTER (OUTPATIENT)
Facility: CLINIC | Age: 26
Discharge: HOME | End: 2021-05-11
Attending: OTOLARYNGOLOGY | Admitting: OTOLARYNGOLOGY
Payer: COMMERCIAL

## 2021-05-11 VITALS
SYSTOLIC BLOOD PRESSURE: 124 MMHG | OXYGEN SATURATION: 95 % | DIASTOLIC BLOOD PRESSURE: 70 MMHG | HEART RATE: 67 BPM | RESPIRATION RATE: 14 BRPM | TEMPERATURE: 98.4 F

## 2021-05-11 DIAGNOSIS — Z98.890 POSTOPERATIVE NAUSEA: ICD-10-CM

## 2021-05-11 DIAGNOSIS — G89.18 POSTOPERATIVE PAIN: ICD-10-CM

## 2021-05-11 DIAGNOSIS — J34.3 HYPERTROPHY OF BOTH INFERIOR NASAL TURBINATES: ICD-10-CM

## 2021-05-11 DIAGNOSIS — J34.2 DEVIATED NASAL SEPTUM: ICD-10-CM

## 2021-05-11 DIAGNOSIS — R11.0 POSTOPERATIVE NAUSEA: ICD-10-CM

## 2021-05-11 DIAGNOSIS — T17.1XXA FOREIGN BODY IN NOSE, INITIAL ENCOUNTER: Primary | ICD-10-CM

## 2021-05-11 PROCEDURE — 272N000001 HC OR GENERAL SUPPLY STERILE: Performed by: OTOLARYNGOLOGY

## 2021-05-11 PROCEDURE — 250N000026 HC DESFLURANE, PER MIN: Performed by: OTOLARYNGOLOGY

## 2021-05-11 PROCEDURE — 250N000009 HC RX 250: Performed by: NURSE ANESTHETIST, CERTIFIED REGISTERED

## 2021-05-11 PROCEDURE — 30520 REPAIR OF NASAL SEPTUM: CPT | Performed by: OTOLARYNGOLOGY

## 2021-05-11 PROCEDURE — 999N000141 HC STATISTIC PRE-PROCEDURE NURSING ASSESSMENT: Performed by: OTOLARYNGOLOGY

## 2021-05-11 PROCEDURE — 250N000013 HC RX MED GY IP 250 OP 250 PS 637: Performed by: OTOLARYNGOLOGY

## 2021-05-11 PROCEDURE — 250N000011 HC RX IP 250 OP 636: Performed by: OTOLARYNGOLOGY

## 2021-05-11 PROCEDURE — 710N000012 HC RECOVERY PHASE 2, PER MINUTE: Performed by: OTOLARYNGOLOGY

## 2021-05-11 PROCEDURE — 258N000003 HC RX IP 258 OP 636: Performed by: NURSE ANESTHETIST, CERTIFIED REGISTERED

## 2021-05-11 PROCEDURE — 30140 RESECT INFERIOR TURBINATE: CPT | Mod: 50 | Performed by: OTOLARYNGOLOGY

## 2021-05-11 PROCEDURE — 250N000011 HC RX IP 250 OP 636: Performed by: NURSE ANESTHETIST, CERTIFIED REGISTERED

## 2021-05-11 PROCEDURE — 370N000017 HC ANESTHESIA TECHNICAL FEE, PER MIN: Performed by: OTOLARYNGOLOGY

## 2021-05-11 PROCEDURE — 710N000010 HC RECOVERY PHASE 1, LEVEL 2, PER MIN: Performed by: OTOLARYNGOLOGY

## 2021-05-11 PROCEDURE — C1726 CATH, BAL DIL, NON-VASCULAR: HCPCS | Performed by: OTOLARYNGOLOGY

## 2021-05-11 PROCEDURE — 250N000009 HC RX 250: Performed by: OTOLARYNGOLOGY

## 2021-05-11 PROCEDURE — 360N000076 HC SURGERY LEVEL 3, PER MIN: Performed by: OTOLARYNGOLOGY

## 2021-05-11 RX ORDER — OXYCODONE AND ACETAMINOPHEN 5; 325 MG/1; MG/1
1 TABLET ORAL EVERY 6 HOURS PRN
Status: DISCONTINUED | OUTPATIENT
Start: 2021-05-11 | End: 2021-05-11 | Stop reason: HOSPADM

## 2021-05-11 RX ORDER — OXYCODONE AND ACETAMINOPHEN 5; 325 MG/1; MG/1
1 TABLET ORAL EVERY 6 HOURS PRN
Qty: 12 TABLET | Refills: 0 | Status: SHIPPED | OUTPATIENT
Start: 2021-05-11 | End: 2021-05-15

## 2021-05-11 RX ORDER — NALOXONE HYDROCHLORIDE 0.4 MG/ML
0.2 INJECTION, SOLUTION INTRAMUSCULAR; INTRAVENOUS; SUBCUTANEOUS
Status: DISCONTINUED | OUTPATIENT
Start: 2021-05-11 | End: 2021-05-11 | Stop reason: HOSPADM

## 2021-05-11 RX ORDER — FENTANYL CITRATE 50 UG/ML
INJECTION, SOLUTION INTRAMUSCULAR; INTRAVENOUS PRN
Status: DISCONTINUED | OUTPATIENT
Start: 2021-05-11 | End: 2021-05-11

## 2021-05-11 RX ORDER — ONDANSETRON 4 MG/1
4-8 TABLET, ORALLY DISINTEGRATING ORAL EVERY 8 HOURS PRN
Qty: 8 TABLET | Refills: 0 | Status: SHIPPED | OUTPATIENT
Start: 2021-05-11 | End: 2022-04-20

## 2021-05-11 RX ORDER — DIMENHYDRINATE 50 MG/ML
25 INJECTION, SOLUTION INTRAMUSCULAR; INTRAVENOUS
Status: DISCONTINUED | OUTPATIENT
Start: 2021-05-11 | End: 2021-05-11 | Stop reason: HOSPADM

## 2021-05-11 RX ORDER — DEXAMETHASONE SODIUM PHOSPHATE 10 MG/ML
10 INJECTION, SOLUTION INTRAMUSCULAR; INTRAVENOUS ONCE
Status: COMPLETED | OUTPATIENT
Start: 2021-05-11 | End: 2021-05-11

## 2021-05-11 RX ORDER — PROPOFOL 10 MG/ML
INJECTION, EMULSION INTRAVENOUS PRN
Status: DISCONTINUED | OUTPATIENT
Start: 2021-05-11 | End: 2021-05-11

## 2021-05-11 RX ORDER — FENTANYL CITRATE 50 UG/ML
25-50 INJECTION, SOLUTION INTRAMUSCULAR; INTRAVENOUS
Status: DISCONTINUED | OUTPATIENT
Start: 2021-05-11 | End: 2021-05-11 | Stop reason: HOSPADM

## 2021-05-11 RX ORDER — ONDANSETRON 2 MG/ML
INJECTION INTRAMUSCULAR; INTRAVENOUS PRN
Status: DISCONTINUED | OUTPATIENT
Start: 2021-05-11 | End: 2021-05-11

## 2021-05-11 RX ORDER — SODIUM CHLORIDE, SODIUM LACTATE, POTASSIUM CHLORIDE, CALCIUM CHLORIDE 600; 310; 30; 20 MG/100ML; MG/100ML; MG/100ML; MG/100ML
INJECTION, SOLUTION INTRAVENOUS CONTINUOUS
Status: DISCONTINUED | OUTPATIENT
Start: 2021-05-11 | End: 2021-05-11 | Stop reason: HOSPADM

## 2021-05-11 RX ORDER — ONDANSETRON 2 MG/ML
4 INJECTION INTRAMUSCULAR; INTRAVENOUS EVERY 30 MIN PRN
Status: DISCONTINUED | OUTPATIENT
Start: 2021-05-11 | End: 2021-05-11 | Stop reason: HOSPADM

## 2021-05-11 RX ORDER — NALOXONE HYDROCHLORIDE 0.4 MG/ML
0.4 INJECTION, SOLUTION INTRAMUSCULAR; INTRAVENOUS; SUBCUTANEOUS
Status: DISCONTINUED | OUTPATIENT
Start: 2021-05-11 | End: 2021-05-11 | Stop reason: HOSPADM

## 2021-05-11 RX ORDER — MEPERIDINE HYDROCHLORIDE 25 MG/ML
12.5 INJECTION INTRAMUSCULAR; INTRAVENOUS; SUBCUTANEOUS
Status: DISCONTINUED | OUTPATIENT
Start: 2021-05-11 | End: 2021-05-11 | Stop reason: HOSPADM

## 2021-05-11 RX ORDER — LIDOCAINE HYDROCHLORIDE 20 MG/ML
INJECTION, SOLUTION INFILTRATION; PERINEURAL PRN
Status: DISCONTINUED | OUTPATIENT
Start: 2021-05-11 | End: 2021-05-11

## 2021-05-11 RX ORDER — SULFAMETHOXAZOLE AND TRIMETHOPRIM 400; 80 MG/1; MG/1
1 TABLET ORAL 2 TIMES DAILY
Qty: 12 TABLET | Refills: 0 | Status: SHIPPED | OUTPATIENT
Start: 2021-05-11 | End: 2021-05-17

## 2021-05-11 RX ORDER — PROPOFOL 10 MG/ML
INJECTION, EMULSION INTRAVENOUS CONTINUOUS PRN
Status: DISCONTINUED | OUTPATIENT
Start: 2021-05-11 | End: 2021-05-11

## 2021-05-11 RX ORDER — HYDROMORPHONE HYDROCHLORIDE 1 MG/ML
.3-.5 INJECTION, SOLUTION INTRAMUSCULAR; INTRAVENOUS; SUBCUTANEOUS EVERY 10 MIN PRN
Status: DISCONTINUED | OUTPATIENT
Start: 2021-05-11 | End: 2021-05-11 | Stop reason: HOSPADM

## 2021-05-11 RX ORDER — HYDROCODONE BITARTRATE AND ACETAMINOPHEN 5; 325 MG/1; MG/1
1-2 TABLET ORAL EVERY 4 HOURS PRN
Qty: 20 TABLET | Refills: 0 | Status: SHIPPED | OUTPATIENT
Start: 2021-05-11 | End: 2022-04-20

## 2021-05-11 RX ORDER — ONDANSETRON 4 MG/1
4 TABLET, ORALLY DISINTEGRATING ORAL EVERY 30 MIN PRN
Status: DISCONTINUED | OUTPATIENT
Start: 2021-05-11 | End: 2021-05-11 | Stop reason: HOSPADM

## 2021-05-11 RX ORDER — LIDOCAINE HYDROCHLORIDE AND EPINEPHRINE 10; 10 MG/ML; UG/ML
INJECTION, SOLUTION INFILTRATION; PERINEURAL PRN
Status: DISCONTINUED | OUTPATIENT
Start: 2021-05-11 | End: 2021-05-11 | Stop reason: HOSPADM

## 2021-05-11 RX ORDER — MUPIROCIN 20 MG/G
OINTMENT TOPICAL PRN
Status: DISCONTINUED | OUTPATIENT
Start: 2021-05-11 | End: 2021-05-11 | Stop reason: HOSPADM

## 2021-05-11 RX ADMIN — DEXMEDETOMIDINE HYDROCHLORIDE 10 MCG: 100 INJECTION, SOLUTION INTRAVENOUS at 08:44

## 2021-05-11 RX ADMIN — LIDOCAINE HYDROCHLORIDE 100 MG: 20 INJECTION, SOLUTION INFILTRATION; PERINEURAL at 07:39

## 2021-05-11 RX ADMIN — LIDOCAINE HYDROCHLORIDE 1 ML: 10 INJECTION, SOLUTION EPIDURAL; INFILTRATION; INTRACAUDAL; PERINEURAL at 06:54

## 2021-05-11 RX ADMIN — PROPOFOL 200 MG: 10 INJECTION, EMULSION INTRAVENOUS at 07:39

## 2021-05-11 RX ADMIN — OXYCODONE HYDROCHLORIDE AND ACETAMINOPHEN 1 TABLET: 5; 325 TABLET ORAL at 10:11

## 2021-05-11 RX ADMIN — SODIUM CHLORIDE, POTASSIUM CHLORIDE, SODIUM LACTATE AND CALCIUM CHLORIDE: 600; 310; 30; 20 INJECTION, SOLUTION INTRAVENOUS at 06:54

## 2021-05-11 RX ADMIN — ROCURONIUM BROMIDE 50 MG: 10 INJECTION INTRAVENOUS at 07:39

## 2021-05-11 RX ADMIN — HYDROMORPHONE HYDROCHLORIDE 0.5 MG: 1 INJECTION, SOLUTION INTRAMUSCULAR; INTRAVENOUS; SUBCUTANEOUS at 08:47

## 2021-05-11 RX ADMIN — ONDANSETRON 4 MG: 2 INJECTION INTRAMUSCULAR; INTRAVENOUS at 08:30

## 2021-05-11 RX ADMIN — FENTANYL CITRATE 50 MCG: 50 INJECTION, SOLUTION INTRAMUSCULAR; INTRAVENOUS at 07:37

## 2021-05-11 RX ADMIN — MIDAZOLAM 2 MG: 1 INJECTION INTRAMUSCULAR; INTRAVENOUS at 07:33

## 2021-05-11 RX ADMIN — SODIUM CHLORIDE, POTASSIUM CHLORIDE, SODIUM LACTATE AND CALCIUM CHLORIDE: 600; 310; 30; 20 INJECTION, SOLUTION INTRAVENOUS at 08:45

## 2021-05-11 RX ADMIN — FENTANYL CITRATE 50 MCG: 50 INJECTION, SOLUTION INTRAMUSCULAR; INTRAVENOUS at 07:39

## 2021-05-11 RX ADMIN — DEXMEDETOMIDINE HYDROCHLORIDE 10 MCG: 100 INJECTION, SOLUTION INTRAVENOUS at 08:54

## 2021-05-11 RX ADMIN — SUGAMMADEX 200 MG: 100 INJECTION, SOLUTION INTRAVENOUS at 08:50

## 2021-05-11 RX ADMIN — DEXAMETHASONE SODIUM PHOSPHATE 10 MG: 10 INJECTION, SOLUTION INTRAMUSCULAR; INTRAVENOUS at 07:56

## 2021-05-11 RX ADMIN — PROPOFOL 100 MCG/KG/MIN: 10 INJECTION, EMULSION INTRAVENOUS at 07:43

## 2021-05-11 NOTE — DISCHARGE INSTRUCTIONS
Home care Instructions after endoscopic sinus surgery with our without septoplasty and turbinoplasty.        Dr. Triana    1. DO NOT blow your nose until you see your doctor for your first post-op visit. You may have a full feeling due to swelling.  2. If your septum has been straightened, soft plastic splints are placed against each side of the septum. These will be removed 7-10 days after surgery.   3. Avoid sneezing. If sneezing cannot be avoided, sneeze with your mouth open.  4. Keep your head elevated about 30 degrees for the first week after surgery. This will help reduce swelling and pain.  5. Arrange to have extra humidity in your home. This will help prevent a sore throat and promote comfort. Keep humidity at 30% if possible.   6. No lifting above 30 pounds for the first week after surgery. Then, you may increase the amount gradually. After 14 days, restrictions are lifted.   7. Pain medications can cause constipation. It is recommended to take a stool softener or fiber laxative while on prescription pain medications. Do not strain to have a bowel movement.   8. Do not bend over or hang your head down for the first 2-3 weeks.   9. Unless instructed otherwise, the day following your surgery, begin saline irrigations to both sides of your nose with warm salt water 3 times a day. Use a new baby bulb syringe which can be purchased at a pharmacy.  When irrigating, sit in a chair in front of a sink.  You may make your own solution or purchase an over-the-counter saline solution such as Ayr brand or Neilmed solution.  Making your own saline solution: Purchase 1 gallon of distilled water.    Add 5 1/2 tsp salt.    Shake to dissolve salt.    Fill clean cup with mixture, heat in microwave or place under hot tap water to warm mixture to lukewarm.    Fill syringe and irrigate each nostril, using 1-2 full syringes.    Direct the saline solution toward your forehead and let the solution run out of your nostrils  with your head leaning forward.    Irrigations are especially important during the first month after surgery, but may be continued longer.    It is normal to feel very tired during the first 1-2 weeks after surgery. It is important to rest and drink lots of fluids.  Patients who have had endoscopic sinus surgery should take a minimum of 1 week off from work.    For the first 2 months after sinus surgery, clinic visits are usually scheduled every 2-3 weeks for endoscopic sinus cleaning. This is done to ensure proper healing.     If bleeding occurs:  1. Sit upright, leaning slightly forward with your head tilted downward.  2. Pinch your nostrils together tightly for 15 minutes.  3. Then release your  and check for bleeding.   4. If bleeding continues, repeat steps 1 & 2.  5. If bleeding still continues, go to the nearest emergency room. Continue to squeeze nose tightly and keep your head tilted downward.  6. You may use Afrin nasal spray, an over-the-counter medication. Spray into the nostril that is bleeding. Do this every 2 hours for 24 hours.     Call your doctor if:  1. The pain increases rather than decreases after the first 4 days.   2. You have a temperature over 101 degrees that will not go down with the use of Tylenol.  3. You have had uncontrolled bleeding.     If you have any questions or problems, please call us at [phone]. You can reach a doctor at this number 24 hours a day or call [facility] Nurse Advice line at [phone].     [facility]   Adult Discharge Orders & Instructions after anesthesia    For 24 hours after surgery    1. Get plenty of rest.  A responsible adult must stay with you for at least 24 hours after you leave the hospital.   2. Do not drive or use heavy equipment.  If you have weakness or tingling, don't drive or use heavy equipment until this feeling goes away.  3. Do not drink alcohol.  4. Avoid strenuous or risky activities.   Ask for help when climbing stairs.   5. You may feel lightheaded.  If so, sit for a few minutes before standing.  Have someone help you get up.   6. You may have a slight fever. Call the doctor if your fever is over 100 F (37.7 C) (taken under the tongue) or lasts longer than 24 hours.  7. You may have a dry mouth, a sore throat, muscle aches or trouble sleeping.  These should go away after 24 hours.  8. Do not make important or legal decisions.  We don t expect you to have any problems from the surgery or treatment you had today. Just in case, here s what to do if you have pain, upset stomach (nausea), bleeding or infection:  Pain:  Take medicines your doctor has prescribed or over-the-counter medicine they have suggested. Resting and using ice packs can help, too. For surgery on an arm or leg, raise it on a pillow to ease swelling. Call your doctor if these methods don t work.  Copyright Frantz Schreiber, Licensed under [employer]  Upset stomach (nausea):  Take anti-nausea medicine approved by your doctor. Drink clear liquids like apple juice, ginger ale, broth or 7-Up. Be sure to drink enough fluids. Rest can help, too. Move to normal foods when you re ready.   Bleeding:  In the first 24 hours, you may see a little blood on your dressing, about the size of a quarter. You don t need to worry about this much blood, but if the blood spot keeps getting bigger:    Put pressure on the wound if you can, AND    Call your doctor.  Copyright [place], Licensed under [employer]  Fever/Infection: Please call your doctor if you have any of these signs:    Redness    Swelling    Wound feels warm    Pain gets worse    Bad-smelling fluid leaks from wound    Fever or chills  Call your doctor for any of the followin.  It has been over 8 to 10 hours since surgery and you are still not able to urinate (pass water).    2.  Headache for over 24 hours.    Nurse advice line: [phone]

## 2021-05-11 NOTE — ANESTHESIA PREPROCEDURE EVALUATION
Anesthesia Pre-Procedure Evaluation    Patient: Guillermo Terrell   MRN: [id number] : 1995        Preoperative Diagnosis: Deviated nasal septum [J34.2]  Hypertrophy of both inferior nasal turbinates [J34.3]   Procedure : Procedure(s):  SEPTOPLASTY, NOSE, WITH submucous resection of turbinates  ENDOSCOPIC BALLOON SINUPLASTY ACCLARENT     Past Medical History:   Diagnosis Date     Lyme disease       Past Surgical History:   Procedure Laterality Date     TONSILLECTOMY & ADENOIDECTOMY        Allergies   Allergen Reactions     Lorabid [Cephalosporins] Rash     When he was around [age] year old.      Social History     Tobacco Use     Smoking status: Former Smoker     Types: Cigarettes     Smokeless tobacco: Current User     Types: Chew     Tobacco comment: sometimes   Substance Use Topics     Alcohol use: Yes      Wt Readings from Last 1 Encounters:   21 135.4 kg (298 lb 6.4 oz)        Anesthesia Evaluation   Pt has had prior anesthetic. Type: General.        ROS/MED HX  ENT/Pulmonary: Comment: Deviated septum     (+) allergic rhinitis, tobacco use, Past use,     Neurologic:  - neg neurologic ROS     Cardiovascular:     (+) hypertension-----    METS/Exercise Tolerance:     Hematologic:  - neg hematologic  ROS     Musculoskeletal: Comment: Hyperhidrosis of axilla        GI/Hepatic:  - neg GI/hepatic ROS     Renal/Genitourinary:  - neg Renal ROS     Endo:     (+) Obesity,     Psychiatric/Substance Use: Comment: ADHD      Infectious Disease: Comment: Lyme disease       Malignancy:  - neg malignancy ROS     Other:  - neg other ROS          Physical Exam    Airway  airway exam normal      Mallampati: II   TM distance: > 3 FB   Neck ROM: full   Mouth opening: > 3 cm    Respiratory Devices and Support         Dental  no notable dental history         Cardiovascular   cardiovascular exam normal       Rhythm and rate: regular and normal     Pulmonary   pulmonary exam normal        breath sounds clear to  auscultation           OUTSIDE LABS:  CBC:   Lab Results   Component Value Date    HGB 13.4 06/20/2008     BMP: No results found for: NA, POTASSIUM, CHLORIDE, CO2, BUN, CR, GLC  COAGS: No results found for: PTT, INR, FIBR  POC: No results found for: BGM, HCG, HCGS  HEPATIC: No results found for: ALBUMIN, PROTTOTAL, ALT, AST, GGT, ALKPHOS, BILITOTAL, BILIDIRECT, TARIK  OTHER:   Lab Results   Component Value Date    A1C 5.3 06/20/2008       Anesthesia Plan    ASA Status:  3   NPO Status:  NPO Appropriate    Anesthesia Type: General.     - Airway: ETT   Induction: Intravenous, Propofol.   Maintenance: Balanced.        Consents    Anesthesia Plan(s) and associated risks, benefits, and realistic alternatives discussed. Questions answered and patient/representative(s) expressed understanding.     - Discussed with:  Patient      - Extended Intubation/Ventilatory Support Discussed: No.      - Patient is DNR/DNI Status: No    Use of blood products discussed: No .     Postoperative Care    Pain management: Oral pain medications.   PONV prophylaxis: Ondansetron (or other 5HT-3), Dexamethasone or Solumedrol     Comments:    The risks and benefits of anesthesia, and the alternatives where applicable, have been discussed with the patient, and they wish to proceed.            GEOVANY Rowan CRNA

## 2021-05-11 NOTE — ANESTHESIA CARE TRANSFER NOTE
Patient: Guillermo Terrell    Procedure(s):  SEPTOPLASTY, NOSE, WITH submucous resection of turbinates  ENDOSCOPIC BALLOON SINUPLASTY ACCLARENT    Diagnosis: Deviated nasal septum [J34.2]  Hypertrophy of both inferior nasal turbinates [J34.3]  Diagnosis Additional Information: No value filed.    Anesthesia Type:   General     Note:    Oropharynx: oropharynx clear of all foreign objects and spontaneously breathing  Level of Consciousness: drowsy  Oxygen Supplementation: face mask    Independent Airway: airway patency satisfactory and stable  Dentition: dentition unchanged  Vital Signs Stable: post-procedure vital signs reviewed and stable  Report to RN Given: handoff report given  Patient transferred to: PACU    Handoff Report: Identifed the Patient, Identified the Reponsible Provider, Reviewed the pertinent medical history, Discussed the surgical course, Reviewed Intra-OP anesthesia mangement and issues during anesthesia, Set expectations for post-procedure period and Allowed opportunity for questions and acknowledgement of understanding      Vitals: (Last set prior to Anesthesia Care Transfer)  CRNA VITALS  5/11/2021 0831 - 5/11/2021 0904      5/11/2021             Pulse:  99    SpO2:  (!) 88 %        Electronically Signed By: GEOVANY Mchugh CRNA  May 11, 2021  9:04 AM

## 2021-05-11 NOTE — ANESTHESIA PROCEDURE NOTES
Airway       Patient location during procedure: OR  Staff -        CRNA: Court Dos Santos APRN CRNA       Performed By: CRNA  Consent for Airway        Urgency: elective  Indications and Patient Condition       Indications for airway management: all-procedural       Induction type:intravenous       Mask difficulty assessment: 1 - vent by mask    Final Airway Details       Final airway type: endotracheal airway       Successful airway: ETT - single  Endotracheal Airway Details        ETT size (mm): 7.0       Cuffed: yes       Successful intubation technique: direct laryngoscopy       DL Blade Type: Best 3       Grade View of Cords: 1       Adjucts: stylet       Position: Right       Measured from: lips       Bite block used: Oral Airway    Post intubation assessment        Placement verified by: capnometry, equal breath sounds and chest rise        Number of attempts at approach: 1       Number of other approaches attempted: 0       Secured with: plastic tape       Ease of procedure: easy       Dentition: Intact and Unchanged    Medication(s) Administered   Medication Administration Time: 5/11/2021 7:42 AM

## 2021-05-11 NOTE — OP NOTE
OTOLARYNGOLOGY OPERATIVE NOTE    SURGEON: Timur Triana MD     ASSISTANT: none     PREOPERATIVE DIAGNOSES:   1. Nasal obstruction  2. Deviated septum  3. Inferior turbinate hypertrophy    POSTOPERATIVE DIAGNOSES:   Same    PROCEDURE:   1. Septoplasty  2. Submucosal resection of turbinates    INDICATIONS: As above.     SURGICAL FINDINGS:   Deviated septum to the left anterior and right posterior    BRIEF HISTORY: Patient has a history of deviated septum, large inferior turbinates. The patient understands the risks and benefits of surgery, limitations, complications including but not limited to bleeding, infection, nasal septum perforation, recurrence of symptoms, need for additional procedures, need for repeat procedures, chronic epistaxis, risks related to anesthesia amongst others. Wishes surgery and now fully agrees to it.     DESCRIPTION OF PROCEDURE: The patient is taken to the operating room, placed under general endotracheal anesthetic, appropriately positioned, prepped and draped. We examined the nose, saw that indeed she is quite deflected to the left side. We started a septoplasty on the left side in an anterior hemitransfixion position, carrying down the incision to the floor of the nose. We identified the anterior aspect of the quadrangular cartilage and with the yanelis elevator, carefully cleared up the mucoperichondrium. Once we started elevating the flap, we switched to a [place] elevator. Further developed the flap superiorly, posteriorly, inferiorly towards the floor of the nose. We identified some spurs in the floor of the nose.. We used a swivel knife to remove small section of the posterior quadrangular cartilage.  At this point the flap was brought back and closed anteriorly in the septal columellar fashion using 4-0 chromic suture..  At this point, we also addressed the inferior turbinates.  We injected the inferior turbinates with 1% lidocaine with epinephrine at 1:100,000.  Using a 2.9mm  yumiko microdebrider, two insertion points were created and the microdebrider was used to remove submucosal tissue and bone in each of the inferior turbinates.  Bipolar cautery was used to control hemostasis. Acclerent septal balloon was then brought into the field under the guidance was reduced endoscope and dilations were performed bilaterally to 8 yas first on the right side twice done on the left side.  Unfortunately the right is still significant spur that is noted in the inferior aspect of the septum partially blocking posterior aspect of the nasal vestibule.  Using Slime elevator under the guidance of the resectoscope mucoperichondrial periosteum was carefully elevated and using a Koby-Cut instruments as well as small Blakesley instruments the spur was successfully removed.  Mucosa was draped over that area providing nice access posteriorly and open airway.  We then fashioned the breathe easy splints making a much smaller to stabilize the anterior septum and secured them with a 3-0 nylon suture. The patient tolerated the procedure well with about 20mL blood loss. Extubated in the OR, taken to recovery in stable condition.   KARRI ALLISON MD

## 2021-05-11 NOTE — ANESTHESIA POSTPROCEDURE EVALUATION
Patient: Guillermo Terrell    Procedure(s):  SEPTOPLASTY, NOSE, WITH submucous resection of turbinates  ENDOSCOPIC BALLOON SINUPLASTY ACCLARENT    Diagnosis:Deviated nasal septum [J34.2]  Hypertrophy of both inferior nasal turbinates [J34.3]  Diagnosis Additional Information: No value filed.    Anesthesia Type:  General    Note:  Disposition: Outpatient   Postop Pain Control: Uneventful            Sign Out: Well controlled pain   PONV: No   Neuro/Psych: Uneventful            Sign Out: Acceptable/Baseline neuro status   Airway/Respiratory: Uneventful            Sign Out: Acceptable/Baseline resp. status   CV/Hemodynamics: Uneventful            Sign Out: Acceptable CV status   Other NRE: NONE   DID A NON-ROUTINE EVENT OCCUR? No    Event details/Postop Comments:  Pt was happy with anesthesia care.  No complications.  I will follow up with the pt if needed.           Last vitals:  Vitals:    05/11/21 1015 05/11/21 1030 05/11/21 1045   BP: (!) 157/98  124/70   Pulse: 67     Resp:      Temp: 98.4  F (36.9  C) 98.4  F (36.9  C)    SpO2: 95% 95%        Last vitals prior to Anesthesia Care Transfer:  CRNA VITALS  5/11/2021 0831 - 5/11/2021 0931      5/11/2021             Pulse:  99    SpO2:  (!) 88 %          Electronically Signed By: GEOVANY Mchugh CRNA  May 11, 2021  11:42 AM

## 2021-05-18 NOTE — PROGRESS NOTES
History of Present Illness - Guillermo Terrell is a 25 year old male who is status post septoplasty on 5/11/21.  The patient tells me that other than the sense of congestion, they have had no problems.  No headaches, fevers, chills, or change in vision.      Physical Exam:  Vitals - There were no vitals taken for this visit.    General - The patient is well nourished and well developed, and appears to have good nutritional status.  Alert and oriented to person and place, answers questions and cooperates with examination appropriately.     Head and Face - Normocephalic and atraumatic, with no gross asymmetry noted of the contour of the facial features.  The facial nerve is intact, with strong symmetric movements.    Eyes - Extraocular movements intact, and the pupils were reactive to light.  Sclera were not icteric or injected, conjunctiva were pink and moist.    Mouth - Examination of the oral cavity shows pink, healthy, moist mucosa.  No lesions or ulceration noted.  The dentition are in good repair.  The tongue is mobile and midline.    Nose - After removing the splints and debris, everything looks great.  The incision is well healed and the turbinates are well lateralized. No sign of synechiae or infection.      Preformed in Clinic:  Nasal septal splints removed without difficulty today.      A/P - Guillermo Terrell has had a nice result from septoplasty.  The position of the septum and nasal tip look good.  I will see the patient in 6 weeks for another follow up.  I cautioned them to avoid any trauma to the nose, hard blowing, or twisting.      Timur Triana MD

## 2021-05-19 ENCOUNTER — OFFICE VISIT (OUTPATIENT)
Dept: OTOLARYNGOLOGY | Facility: OTHER | Age: 26
End: 2021-05-19
Payer: COMMERCIAL

## 2021-05-19 VITALS
TEMPERATURE: 98.6 F | SYSTOLIC BLOOD PRESSURE: 124 MMHG | BODY MASS INDEX: 37 KG/M2 | DIASTOLIC BLOOD PRESSURE: 70 MMHG | WEIGHT: 300 LBS

## 2021-05-19 DIAGNOSIS — Z98.890 STATUS POST NASAL SEPTOPLASTY: Primary | ICD-10-CM

## 2021-05-19 PROCEDURE — 99024 POSTOP FOLLOW-UP VISIT: CPT | Performed by: OTOLARYNGOLOGY

## 2021-05-19 ASSESSMENT — PAIN SCALES - GENERAL: PAINLEVEL: MILD PAIN (3)

## 2021-05-19 NOTE — LETTER
5/19/2021         RE: Guillermo Terrell  [address] [place] [address]        Dear Colleague,    Thank you for referring your patient, Guillermo Terrell, to the [facility]. Please see a copy of my visit note below.    History of Present Illness - Guillermo Terrell is a 25 year old male who is status post septoplasty on 5/11/21.  The patient tells me that other than the sense of congestion, they have had no problems.  No headaches, fevers, chills, or change in vision.      Physical Exam:  Vitals - There were no vitals taken for this visit.    General - The patient is well nourished and well developed, and appears to have good nutritional status.  Alert and oriented to person and place, answers questions and cooperates with examination appropriately.     Head and Face - Normocephalic and atraumatic, with no gross asymmetry noted of the contour of the facial features.  The facial nerve is intact, with strong symmetric movements.    Eyes - Extraocular movements intact, and the pupils were reactive to light.  Sclera were not icteric or injected, conjunctiva were pink and moist.    Mouth - Examination of the oral cavity shows pink, healthy, moist mucosa.  No lesions or ulceration noted.  The dentition are in good repair.  The tongue is mobile and midline.    Nose - After removing the splints and debris, everything looks great.  The incision is well healed and the turbinates are well lateralized. No sign of synechiae or infection.      Preformed in Clinic:  Nasal septal splints removed without difficulty today.      A/P - Guillermo Terrell has had a nice result from septoplasty.  The position of the septum and nasal tip look good.  I will see the patient in 6 weeks for another follow up.  I cautioned them to avoid any trauma to the nose, hard blowing, or twisting.      Timur Triana MD        Again, thank you for allowing me to participate in the care of your patient.         Sincerely,        Timur Triana MD, MD

## 2021-06-29 NOTE — PROGRESS NOTES
History of Present Illness - Guillermo Terrell is a 25 year old male presenting in clinic today for a recheck on Patient presents with:  Surgical Followup: septoplasty    Patient status post septoplasty submucous resection of turbinates.  Overall is breathing much much better on both sides but right is slightly better than the left.  He also feels on the left when he breathes and hard he gets unpleasant sour smell in the back.      BP Readings from Last 1 Encounters:   05/19/21 124/70       BP noted to be well controlled today in office.     Guillermo IS NOT a smoker/uses chewing tobacco.  Guillermo already quit      Past Medical History -   Past Medical History:   Diagnosis Date     Lyme disease 2000       Current Medications -   Current Outpatient Medications:      Acetaminophen (TYLENOL PO), Take  by mouth., Disp: , Rfl:      fluticasone (FLONASE) 50 MCG/ACT nasal spray, Spray 1 spray into both nostrils daily, Disp: , Rfl:      HYDROcodone-acetaminophen (NORCO) 5-325 MG tablet, Take 1-2 tablets by mouth every 4 hours as needed for moderate to severe pain (Patient not taking: Reported on 5/19/2021), Disp: 20 tablet, Rfl: 0     methocarbamol (ROBAXIN) 500 MG tablet, Take 1 tablet (500 mg) by mouth as needed for muscle spasms (Patient not taking: Reported on 5/19/2021), Disp: 20 tablet, Rfl: 0     ondansetron (ZOFRAN-ODT) 4 MG ODT tab, Take 1-2 tablets (4-8 mg) by mouth every 8 hours as needed for nausea (Patient not taking: Reported on 5/19/2021), Disp: 8 tablet, Rfl: 0     Pseudoephedrine-guaiFENesin (MUCINEX D PO), , Disp: , Rfl:     Allergies -   Allergies   Allergen Reactions     Lorabid [Cephalosporins] Rash     When he was around [age] year old.       Social History -   Social History     Socioeconomic History     Marital status: Single     Spouse name: Not on file     Number of children: Not on file     Years of education: Not on file     Highest education level: Not on file   Occupational History     Not on file    Social Needs     Financial resource strain: Not on file     Food insecurity     Worry: Not on file     Inability: Not on file     Transportation needs     Medical: Not on file     Non-medical: Not on file   Tobacco Use     Smoking status: Former Smoker     Types: Cigarettes     Smokeless tobacco: Current User     Types: Chew     Tobacco comment: sometimes   Substance and Sexual Activity     Alcohol use: Yes     Drug use: Not Currently     Types: Marijuana     Comment: not for over a year 11/24/2020     Sexual activity: Yes   Lifestyle     Physical activity     Days per week: Not on file     Minutes per session: Not on file     Stress: Not on file   Relationships     Social connections     Talks on phone: Not on file     Gets together: Not on file     Attends Islam service: Not on file     Active member of club or organization: Not on file     Attends meetings of clubs or organizations: Not on file     Relationship status: Not on file     Intimate partner violence     Fear of current or ex partner: Not on file     Emotionally abused: Not on file     Physically abused: Not on file     Forced sexual activity: Not on file   Other Topics Concern      Service Not Asked     Blood Transfusions Not Asked     Caffeine Concern Not Asked     Occupational Exposure Not Asked     Hobby Hazards Not Asked     Sleep Concern Not Asked     Stress Concern Not Asked     Weight Concern Not Asked     Special Diet Not Asked     Back Care Not Asked     Exercise Not Asked     Bike Helmet Not Asked     Seat Belt Not Asked     Self-Exams Not Asked     Parent/sibling w/ CABG, MI or angioplasty before 65F 55M? Not Asked   Social History Narrative     Not on file       Family History -   Family History   Problem Relation Age of Onset     Breast Cancer Maternal Grandmother      Circulatory Paternal Grandmother         Annuersym     Diabetes Mother         Type 1     Diabetes Other         paternal cousin Type 1       Review of Systems  - As per HPI and PMHx, otherwise review of system review of the head and neck negative. Otherwise 10+ review of system is negative    Physical Exam  There were no vitals taken for this visit.  BMI: There is no height or weight on file to calculate BMI.    General - The patient is well nourished and well developed, and appears to have good nutritional status.  Alert and oriented to person and place, answers questions and cooperates with examination appropriately.    SKIN - No suspicious lesions or rashes.  Respiration - No respiratory distress.  Head and Face - Normocephalic and atraumatic, with no gross asymmetry noted of the contour of the facial features.  The facial nerve is intact, with strong symmetric movements.    Voice and Breathing - The patient was breathing comfortably without the use of accessory muscles. The patients voice was clear and strong, and had appropriate pitch and quality.    Ears - Bilateral pinna and EACs with normal appearing overlying skin. Tympanic membrane intact with good mobility on pneumatic otoscopy bilaterally. Bony landmarks of the ossicular chain are normal. The tympanic membranes are normal in appearance. No retraction, perforation, or masses.  No fluid or purulence was seen in the external canal or the middle ear.     Eyes - Extraocular movements intact.  Sclera were not icteric or injected, conjunctiva were pink and moist.    Mouth - Examination of the oral cavity showed pink, healthy oral mucosa. No lesions or ulcerations noted.  The tongue was mobile and midline, and the dentition were in good condition.      Throat - The walls of the oropharynx were smooth, pink, moist, symmetric, and had no lesions or ulcerations.  The tonsillar pillars and soft palate were symmetric. The uvula was midline on elevation.    Neck - Normal midline excursion of the laryngotracheal complex during swallowing.  Full range of motion on passive movement.  Palpation of the occipital, submental,  submandibular, internal jugular chain, and supraclavicular nodes did not demonstrate any abnormal lymph nodes or masses.  The carotid pulse was palpable bilaterally.  Palpation of the thyroid was soft and smooth, with no nodules or goiter appreciated.  The trachea was mobile and midline.    Nose - External contour is symmetric, no gross deflection or scars.  Nasal mucosa is pink and moist with no abnormal mucus.  The septum was midline and non-obstructive, turbinates of normal size and position.  No polyps, masses, or purulence noted on examination.  The only finding was a large scab on the posterior inferior aspect of the inferior turbinate on the left side.  It is removed with suction and alligator forceps.  Patient is able to breathe right away much better on the left    Neuro - Nonfocal neuro exam is normal, CN 2 through 12 intact, normal gait and muscle tone.      Performed in clinic today:  No procedures preformed in clinic today      A/P - Guillermo Terrell is a 25 year old male Patient presents with:  Surgical Followup: septoplasty    Patient status post septoplasty submucous resection of turbinates.  Residual scab was removed today.  He will continue using saline especially on the left side for the next 5 days and then can stop.  He will see me back as needed.    Guillermo should follow up as needed.            Timur Triana MD

## 2021-06-30 ENCOUNTER — OFFICE VISIT (OUTPATIENT)
Dept: OTOLARYNGOLOGY | Facility: OTHER | Age: 26
End: 2021-06-30
Payer: COMMERCIAL

## 2021-06-30 VITALS
BODY MASS INDEX: 36.39 KG/M2 | TEMPERATURE: 97.3 F | DIASTOLIC BLOOD PRESSURE: 70 MMHG | SYSTOLIC BLOOD PRESSURE: 130 MMHG | WEIGHT: 295 LBS

## 2021-06-30 DIAGNOSIS — Z98.890 STATUS POST NASAL SEPTOPLASTY: Primary | ICD-10-CM

## 2021-06-30 PROCEDURE — 99024 POSTOP FOLLOW-UP VISIT: CPT | Performed by: OTOLARYNGOLOGY

## 2021-06-30 NOTE — LETTER
6/30/2021         RE: Guillermo Terrell  [address] [place] [address]        Dear Colleague,    Thank you for referring your patient, Guillermo Terrell, to the [facility]. Please see a copy of my visit note below.    History of Present Illness - Guillermo Terrell is a 25 year old male presenting in clinic today for a recheck on Patient presents with:  Surgical Followup: septoplasty    Patient status post septoplasty submucous resection of turbinates.  Overall is breathing much much better on both sides but right is slightly better than the left.  He also feels on the left when he breathes and hard he gets unpleasant sour smell in the back.      BP Readings from Last 1 Encounters:   05/19/21 124/70       BP noted to be well controlled today in office.     Guillermo IS NOT a smoker/uses chewing tobacco.  Guillermo already quit      Past Medical History -   Past Medical History:   Diagnosis Date     Lyme disease 2000       Current Medications -   Current Outpatient Medications:      Acetaminophen (TYLENOL PO), Take  by mouth., Disp: , Rfl:      fluticasone (FLONASE) 50 MCG/ACT nasal spray, Spray 1 spray into both nostrils daily, Disp: , Rfl:      HYDROcodone-acetaminophen (NORCO) 5-325 MG tablet, Take 1-2 tablets by mouth every 4 hours as needed for moderate to severe pain (Patient not taking: Reported on 5/19/2021), Disp: 20 tablet, Rfl: 0     methocarbamol (ROBAXIN) 500 MG tablet, Take 1 tablet (500 mg) by mouth as needed for muscle spasms (Patient not taking: Reported on 5/19/2021), Disp: 20 tablet, Rfl: 0     ondansetron (ZOFRAN-ODT) 4 MG ODT tab, Take 1-2 tablets (4-8 mg) by mouth every 8 hours as needed for nausea (Patient not taking: Reported on 5/19/2021), Disp: 8 tablet, Rfl: 0     Pseudoephedrine-guaiFENesin (MUCINEX D PO), , Disp: , Rfl:     Allergies -   Allergies   Allergen Reactions     Lorabid [Cephalosporins] Rash     When he was around [age] year old.       Social History -    Social History     Socioeconomic History     Marital status: Single     Spouse name: Not on file     Number of children: Not on file     Years of education: Not on file     Highest education level: Not on file   Occupational History     Not on file   Social Needs     Financial resource strain: Not on file     Food insecurity     Worry: Not on file     Inability: Not on file     Transportation needs     Medical: Not on file     Non-medical: Not on file   Tobacco Use     Smoking status: Former Smoker     Types: Cigarettes     Smokeless tobacco: Current User     Types: Chew     Tobacco comment: sometimes   Substance and Sexual Activity     Alcohol use: Yes     Drug use: Not Currently     Types: Marijuana     Comment: not for over a year 11/24/2020     Sexual activity: Yes   Lifestyle     Physical activity     Days per week: Not on file     Minutes per session: Not on file     Stress: Not on file   Relationships     Social connections     Talks on phone: Not on file     Gets together: Not on file     Attends Holiness service: Not on file     Active member of club or organization: Not on file     Attends meetings of clubs or organizations: Not on file     Relationship status: Not on file     Intimate partner violence     Fear of current or ex partner: Not on file     Emotionally abused: Not on file     Physically abused: Not on file     Forced sexual activity: Not on file   Other Topics Concern      Service Not Asked     Blood Transfusions Not Asked     Caffeine Concern Not Asked     Occupational Exposure Not Asked     Hobby Hazards Not Asked     Sleep Concern Not Asked     Stress Concern Not Asked     Weight Concern Not Asked     Special Diet Not Asked     Back Care Not Asked     Exercise Not Asked     Bike Helmet Not Asked     Seat Belt Not Asked     Self-Exams Not Asked     Parent/sibling w/ CABG, MI or angioplasty before 65F 55M? Not Asked   Social History Narrative     Not on file       Family History -    Family History   Problem Relation Age of Onset     Breast Cancer Maternal Grandmother      Circulatory Paternal Grandmother         Annuersym     Diabetes Mother         Type 1     Diabetes Other         paternal cousin Type 1       Review of Systems - As per HPI and PMHx, otherwise review of system review of the head and neck negative. Otherwise 10+ review of system is negative    Physical Exam  There were no vitals taken for this visit.  BMI: There is no height or weight on file to calculate BMI.    General - The patient is well nourished and well developed, and appears to have good nutritional status.  Alert and oriented to person and place, answers questions and cooperates with examination appropriately.    SKIN - No suspicious lesions or rashes.  Respiration - No respiratory distress.  Head and Face - Normocephalic and atraumatic, with no gross asymmetry noted of the contour of the facial features.  The facial nerve is intact, with strong symmetric movements.    Voice and Breathing - The patient was breathing comfortably without the use of accessory muscles. The patients voice was clear and strong, and had appropriate pitch and quality.    Ears - Bilateral pinna and EACs with normal appearing overlying skin. Tympanic membrane intact with good mobility on pneumatic otoscopy bilaterally. Bony landmarks of the ossicular chain are normal. The tympanic membranes are normal in appearance. No retraction, perforation, or masses.  No fluid or purulence was seen in the external canal or the middle ear.     Eyes - Extraocular movements intact.  Sclera were not icteric or injected, conjunctiva were pink and moist.    Mouth - Examination of the oral cavity showed pink, healthy oral mucosa. No lesions or ulcerations noted.  The tongue was mobile and midline, and the dentition were in good condition.      Throat - The walls of the oropharynx were smooth, pink, moist, symmetric, and had no lesions or ulcerations.  The  tonsillar pillars and soft palate were symmetric. The uvula was midline on elevation.    Neck - Normal midline excursion of the laryngotracheal complex during swallowing.  Full range of motion on passive movement.  Palpation of the occipital, submental, submandibular, internal jugular chain, and supraclavicular nodes did not demonstrate any abnormal lymph nodes or masses.  The carotid pulse was palpable bilaterally.  Palpation of the thyroid was soft and smooth, with no nodules or goiter appreciated.  The trachea was mobile and midline.    Nose - External contour is symmetric, no gross deflection or scars.  Nasal mucosa is pink and moist with no abnormal mucus.  The septum was midline and non-obstructive, turbinates of normal size and position.  No polyps, masses, or purulence noted on examination.  The only finding was a large scab on the posterior inferior aspect of the inferior turbinate on the left side.  It is removed with suction and alligator forceps.  Patient is able to breathe right away much better on the left    Neuro - Nonfocal neuro exam is normal, CN 2 through 12 intact, normal gait and muscle tone.      Performed in clinic today:  No procedures preformed in clinic today      A/P - Guillermo Terrell is a 25 year old male Patient presents with:  Surgical Followup: septoplasty    Patient status post septoplasty submucous resection of turbinates.  Residual scab was removed today.  He will continue using saline especially on the left side for the next 5 days and then can stop.  He will see me back as needed.    Guillermo should follow up as needed.            Timur Triana MD          Again, thank you for allowing me to participate in the care of your patient.        Sincerely,        Timur Triana MD, MD

## 2021-09-14 ENCOUNTER — TELEPHONE (OUTPATIENT)
Dept: SLEEP MEDICINE | Facility: CLINIC | Age: 26
End: 2021-09-14

## 2021-09-14 NOTE — TELEPHONE ENCOUNTER
Reason for Call:  Other appointment    Detailed comments: Patient is calling because he had sinus surgery a few months ago with Dr. Triana and is coming down with a sinus infection. He's wondering if this is normal? Please call him at [phone]    Phone Number Patient can be reached at: Home number on file There is no home phone number on file.    Best Time: any     Can we leave a detailed message on this number? YES    Call taken on 9/14/2021 at 10:18 AM by Sophy Jeong

## 2021-09-14 NOTE — TELEPHONE ENCOUNTER
Patient scheduled with Kamilah 9/16 at 10:45 in [place].  Jessica Medel RN on 9/14/2021 at 11:50 AM

## 2021-09-16 ENCOUNTER — OFFICE VISIT (OUTPATIENT)
Dept: OTOLARYNGOLOGY | Facility: CLINIC | Age: 26
End: 2021-09-16
Payer: COMMERCIAL

## 2021-09-16 VITALS
SYSTOLIC BLOOD PRESSURE: 120 MMHG | DIASTOLIC BLOOD PRESSURE: 70 MMHG | BODY MASS INDEX: 35.07 KG/M2 | TEMPERATURE: 97.8 F | WEIGHT: 288 LBS | HEIGHT: 76 IN

## 2021-09-16 DIAGNOSIS — J30.2 SEASONAL ALLERGIC RHINITIS, UNSPECIFIED TRIGGER: Primary | ICD-10-CM

## 2021-09-16 PROCEDURE — 99213 OFFICE O/P EST LOW 20 MIN: CPT | Performed by: OTOLARYNGOLOGY

## 2021-09-16 ASSESSMENT — MIFFLIN-ST. JEOR: SCORE: 2384.92

## 2021-09-16 NOTE — PROGRESS NOTES
History of Present Illness - Guillermo Terrell is a 25 year old male presenting in clinic today for a recheck on Patient presents with:  RECHECK: sinus concerns, septoplasty, DOS 05/11/21      Patient status post septoplasty in December of this year presents in follow-up.  Even though he breathes well through his nose he complains that he has had more secretions lately.  Also about a week ago started experiencing discomfort in upper teeth pressure in the face even though secretions were still clear but copious.  He attributed to sinus infection.  Denies any fever or chills.  Symptoms have subsided Mucinex DM however still has secretions.  Sense of smell and taste appear to be intact.  He has been tested for allergies in the past tested positive to multiple allergens seasonal and perennial.      BP Readings from Last 1 Encounters:   06/30/21 130/70       BP noted to be well controlled today in office.     Guillermo IS NOT a smoker/but still uses chewing tobacco.    Past Medical History -   Past Medical History:   Diagnosis Date     Lyme disease 2000       Current Medications -   Current Outpatient Medications:      Acetaminophen (TYLENOL PO), Take  by mouth., Disp: , Rfl:      fluticasone (FLONASE) 50 MCG/ACT nasal spray, Spray 1 spray into both nostrils daily, Disp: , Rfl:      HYDROcodone-acetaminophen (NORCO) 5-325 MG tablet, Take 1-2 tablets by mouth every 4 hours as needed for moderate to severe pain (Patient not taking: Reported on 5/19/2021), Disp: 20 tablet, Rfl: 0     methocarbamol (ROBAXIN) 500 MG tablet, Take 1 tablet (500 mg) by mouth as needed for muscle spasms (Patient not taking: Reported on 5/19/2021), Disp: 20 tablet, Rfl: 0     ondansetron (ZOFRAN-ODT) 4 MG ODT tab, Take 1-2 tablets (4-8 mg) by mouth every 8 hours as needed for nausea (Patient not taking: Reported on 5/19/2021), Disp: 8 tablet, Rfl: 0     Pseudoephedrine-guaiFENesin (MUCINEX D PO), , Disp: , Rfl:     Allergies -   Allergies   Allergen  Reactions     Lorabid [Cephalosporins] Rash     When he was around [age] year old.       Social History -   Social History     Socioeconomic History     Marital status: Single     Spouse name: Not on file     Number of children: Not on file     Years of education: Not on file     Highest education level: Not on file   Occupational History     Not on file   Tobacco Use     Smoking status: Former Smoker     Types: Cigarettes     Smokeless tobacco: Current User     Types: Chew     Tobacco comment: sometimes   Substance and Sexual Activity     Alcohol use: Yes     Drug use: Not Currently     Types: Marijuana     Comment: not for over a year 11/24/2020     Sexual activity: Yes   Other Topics Concern      Service Not Asked     Blood Transfusions Not Asked     Caffeine Concern Not Asked     Occupational Exposure Not Asked     Hobby Hazards Not Asked     Sleep Concern Not Asked     Stress Concern Not Asked     Weight Concern Not Asked     Special Diet Not Asked     Back Care Not Asked     Exercise Not Asked     Bike Helmet Not Asked     Seat Belt Not Asked     Self-Exams Not Asked     Parent/sibling w/ CABG, MI or angioplasty before 65F 55M? Not Asked   Social History Narrative     Not on file     Social Determinants of Health     Financial Resource Strain:      Difficulty of Paying Living Expenses:    Food Insecurity:      Worried About Running Out of Food in the Last Year:      Ran Out of Food in the Last Year:    Transportation Needs:      Lack of Transportation (Medical):      Lack of Transportation (Non-Medical):    Physical Activity:      Days of Exercise per Week:      Minutes of Exercise per Session:    Stress:      Feeling of Stress :    Social Connections:      Frequency of Communication with Friends and Family:      Frequency of Social Gatherings with Friends and Family:      Attends Faith Services:      Active Member of Clubs or Organizations:      Attends Club or Organization Meetings:      Marital  Status:    Intimate Partner Violence:      Fear of Current or Ex-Partner:      Emotionally Abused:      Physically Abused:      Sexually Abused:        Family History -   Family History   Problem Relation Age of Onset     Breast Cancer Maternal Grandmother      Circulatory Paternal Grandmother         Annuersym     Diabetes Mother         Type 1     Diabetes Other         paternal cousin Type 1       Review of Systems - As per HPI and PMHx, otherwise review of system review of the head and neck negative. Otherwise 10+ review of system is negative    Physical Exam  There were no vitals taken for this visit.  BMI: There is no height or weight on file to calculate BMI.    General - The patient is well nourished and well developed, and appears to have good nutritional status.  Alert and oriented to person and place, answers questions and cooperates with examination appropriately.    SKIN - No suspicious lesions or rashes.  Respiration - No respiratory distress.  Head and Face - Normocephalic and atraumatic, with no gross asymmetry noted of the contour of the facial features.  The facial nerve is intact, with strong symmetric movements.    Voice and Breathing - The patient was breathing comfortably without the use of accessory muscles. The patients voice was clear and strong, and had appropriate pitch and quality.    Ears - Bilateral pinna and EACs with normal appearing overlying skin. Tympanic membrane intact with good mobility on pneumatic otoscopy bilaterally. Bony landmarks of the ossicular chain are normal. The tympanic membranes are normal in appearance. No retraction, perforation, or masses.  No fluid or purulence was seen in the external canal or the middle ear.     Eyes - Extraocular movements intact.  Sclera were not icteric or injected, conjunctiva were pink and moist.    Mouth - Examination of the oral cavity showed pink, healthy oral mucosa. No lesions or ulcerations noted.  The tongue was mobile and  midline, and the dentition were in good condition.      Throat - The walls of the oropharynx were smooth, pink, moist, symmetric, and had no lesions or ulcerations.  The tonsillar pillars and soft palate were symmetric. Tonsils are absent. The uvula was midline on elevation.    Neck - Normal midline excursion of the laryngotracheal complex during swallowing.  Full range of motion on passive movement.  Palpation of the occipital, submental, submandibular, internal jugular chain, and supraclavicular nodes did not demonstrate any abnormal lymph nodes or masses.  The carotid pulse was palpable bilaterally.  Palpation of the thyroid was soft and smooth, with no nodules or goiter appreciated.  The trachea was mobile and midline.    Nose - External contour is symmetric, no gross deflection or scars.  Nasal mucosa is pink and moist with no abnormal mucus.  The septum was midline and non-obstructive, turbinates of normal size and position.  No polyps, masses, or purulence noted on examination.    Neuro - Nonfocal neuro exam is normal, CN 2 through 12 intact, normal gait and muscle tone.      Performed in clinic today:  No procedures preformed in clinic today      A/P - Guillermo Terrell is a 25 year old male Patient presents with:  RECHECK: sinus concerns, septoplasty, DOS 05/11/21    Patient with likely some issues of allergic rhinitis may have had sinusitis recently self-limited.  At this point will prescribe fluticasone to be used during allergy season until the first freeze and then again early spring into summer.  He will see me again in the summer 2022.          Timur Triana MD

## 2021-09-16 NOTE — LETTER
9/16/2021         RE: Guillermo Terrell  [address] [facility] [address]        Dear Colleague,    Thank you for referring your patient, Guillermo Terrell, to the [facility]. Please see a copy of my visit note below.    History of Present Illness - Guillermo Terrell is a 25 year old male presenting in clinic today for a recheck on Patient presents with:  RECHECK: sinus concerns, septoplasty, DOS 05/11/21      Patient status post septoplasty in December of this year presents in follow-up.  Even though he breathes well through his nose he complains that he has had more secretions lately.  Also about a week ago started experiencing discomfort in upper teeth pressure in the face even though secretions were still clear but copious.  He attributed to sinus infection.  Denies any fever or chills.  Symptoms have subsided Mucinex DM however still has secretions.  Sense of smell and taste appear to be intact.  He has been tested for allergies in the past tested positive to multiple allergens seasonal and perennial.      BP Readings from Last 1 Encounters:   06/30/21 130/70       BP noted to be well controlled today in office.     Guillermo IS NOT a smoker/but still uses chewing tobacco.    Past Medical History -   Past Medical History:   Diagnosis Date     Lyme disease 2000       Current Medications -   Current Outpatient Medications:      Acetaminophen (TYLENOL PO), Take  by mouth., Disp: , Rfl:      fluticasone (FLONASE) 50 MCG/ACT nasal spray, Spray 1 spray into both nostrils daily, Disp: , Rfl:      HYDROcodone-acetaminophen (NORCO) 5-325 MG tablet, Take 1-2 tablets by mouth every 4 hours as needed for moderate to severe pain (Patient not taking: Reported on 5/19/2021), Disp: 20 tablet, Rfl: 0     methocarbamol (ROBAXIN) 500 MG tablet, Take 1 tablet (500 mg) by mouth as needed for muscle spasms (Patient not taking: Reported on 5/19/2021), Disp: 20 tablet, Rfl: 0     ondansetron (ZOFRAN-ODT) 4 MG  ODT tab, Take 1-2 tablets (4-8 mg) by mouth every 8 hours as needed for nausea (Patient not taking: Reported on 5/19/2021), Disp: 8 tablet, Rfl: 0     Pseudoephedrine-guaiFENesin (MUCINEX D PO), , Disp: , Rfl:     Allergies -   Allergies   Allergen Reactions     Lorabid [Cephalosporins] Rash     When he was around [age] year old.       Social History -   Social History     Socioeconomic History     Marital status: Single     Spouse name: Not on file     Number of children: Not on file     Years of education: Not on file     Highest education level: Not on file   Occupational History     Not on file   Tobacco Use     Smoking status: Former Smoker     Types: Cigarettes     Smokeless tobacco: Current User     Types: Chew     Tobacco comment: sometimes   Substance and Sexual Activity     Alcohol use: Yes     Drug use: Not Currently     Types: Marijuana     Comment: not for over a year 11/24/2020     Sexual activity: Yes   Other Topics Concern      Service Not Asked     Blood Transfusions Not Asked     Caffeine Concern Not Asked     Occupational Exposure Not Asked     Hobby Hazards Not Asked     Sleep Concern Not Asked     Stress Concern Not Asked     Weight Concern Not Asked     Special Diet Not Asked     Back Care Not Asked     Exercise Not Asked     Bike Helmet Not Asked     Seat Belt Not Asked     Self-Exams Not Asked     Parent/sibling w/ CABG, MI or angioplasty before 65F 55M? Not Asked   Social History Narrative     Not on file     Social Determinants of Health     Financial Resource Strain:      Difficulty of Paying Living Expenses:    Food Insecurity:      Worried About Running Out of Food in the Last Year:      Ran Out of Food in the Last Year:    Transportation Needs:      Lack of Transportation (Medical):      Lack of Transportation (Non-Medical):    Physical Activity:      Days of Exercise per Week:      Minutes of Exercise per Session:    Stress:      Feeling of Stress :    Social Connections:       Frequency of Communication with Friends and Family:      Frequency of Social Gatherings with Friends and Family:      Attends Denominational Services:      Active Member of Clubs or Organizations:      Attends Club or Organization Meetings:      Marital Status:    Intimate Partner Violence:      Fear of Current or Ex-Partner:      Emotionally Abused:      Physically Abused:      Sexually Abused:        Family History -   Family History   Problem Relation Age of Onset     Breast Cancer Maternal Grandmother      Circulatory Paternal Grandmother         Annuersym     Diabetes Mother         Type 1     Diabetes Other         paternal cousin Type 1       Review of Systems - As per HPI and PMHx, otherwise review of system review of the head and neck negative. Otherwise 10+ review of system is negative    Physical Exam  There were no vitals taken for this visit.  BMI: There is no height or weight on file to calculate BMI.    General - The patient is well nourished and well developed, and appears to have good nutritional status.  Alert and oriented to person and place, answers questions and cooperates with examination appropriately.    SKIN - No suspicious lesions or rashes.  Respiration - No respiratory distress.  Head and Face - Normocephalic and atraumatic, with no gross asymmetry noted of the contour of the facial features.  The facial nerve is intact, with strong symmetric movements.    Voice and Breathing - The patient was breathing comfortably without the use of accessory muscles. The patients voice was clear and strong, and had appropriate pitch and quality.    Ears - Bilateral pinna and EACs with normal appearing overlying skin. Tympanic membrane intact with good mobility on pneumatic otoscopy bilaterally. Bony landmarks of the ossicular chain are normal. The tympanic membranes are normal in appearance. No retraction, perforation, or masses.  No fluid or purulence was seen in the external canal or the middle ear.      Eyes - Extraocular movements intact.  Sclera were not icteric or injected, conjunctiva were pink and moist.    Mouth - Examination of the oral cavity showed pink, healthy oral mucosa. No lesions or ulcerations noted.  The tongue was mobile and midline, and the dentition were in good condition.      Throat - The walls of the oropharynx were smooth, pink, moist, symmetric, and had no lesions or ulcerations.  The tonsillar pillars and soft palate were symmetric. Tonsils are absent. The uvula was midline on elevation.    Neck - Normal midline excursion of the laryngotracheal complex during swallowing.  Full range of motion on passive movement.  Palpation of the occipital, submental, submandibular, internal jugular chain, and supraclavicular nodes did not demonstrate any abnormal lymph nodes or masses.  The carotid pulse was palpable bilaterally.  Palpation of the thyroid was soft and smooth, with no nodules or goiter appreciated.  The trachea was mobile and midline.    Nose - External contour is symmetric, no gross deflection or scars.  Nasal mucosa is pink and moist with no abnormal mucus.  The septum was midline and non-obstructive, turbinates of normal size and position.  No polyps, masses, or purulence noted on examination.    Neuro - Nonfocal neuro exam is normal, CN 2 through 12 intact, normal gait and muscle tone.      Performed in clinic today:  No procedures preformed in clinic today      A/P - Guillermo Terrell is a 25 year old male Patient presents with:  RECHECK: sinus concerns, septoplasty, DOS 05/11/21    Patient with likely some issues of allergic rhinitis may have had sinusitis recently self-limited.  At this point will prescribe fluticasone to be used during allergy season until the first freeze and then again early spring into summer.  He will see me again in the summer 2022.          Timur Triana MD            Again, thank you for allowing me to participate in the care of your patient.         Sincerely,        Timur Triana MD, MD

## 2021-10-23 ENCOUNTER — HEALTH MAINTENANCE LETTER (OUTPATIENT)
Age: 26
End: 2021-10-23

## 2022-04-09 ENCOUNTER — HEALTH MAINTENANCE LETTER (OUTPATIENT)
Age: 27
End: 2022-04-09

## 2022-04-20 ENCOUNTER — VIRTUAL VISIT (OUTPATIENT)
Dept: FAMILY MEDICINE | Facility: OTHER | Age: 27
End: 2022-04-20
Payer: COMMERCIAL

## 2022-04-20 DIAGNOSIS — J32.9 CHRONIC SINUSITIS, UNSPECIFIED LOCATION: Primary | ICD-10-CM

## 2022-04-20 PROCEDURE — 99213 OFFICE O/P EST LOW 20 MIN: CPT | Mod: GT | Performed by: PHYSICIAN ASSISTANT

## 2022-04-20 RX ORDER — AZELASTINE 1 MG/ML
1 SPRAY, METERED NASAL 2 TIMES DAILY
Qty: 30 ML | Refills: 3 | Status: SHIPPED | OUTPATIENT
Start: 2022-04-20 | End: 2023-02-22

## 2022-04-20 NOTE — PROGRESS NOTES
Guillermo is a 26 year old who is being evaluated via a billable video visit.      How would you like to obtain your AVS? [employer]  If the video visit is dropped, the invitation should be resent by: Text to cell phone: [phone]  Will anyone else be joining your video visit? No    Video Start Time: 3:40 PM    Assessment & Plan     Chronic sinusitis, unspecified location  Suspect he may have acute bacterial sinusitis at this time.  He notes allergies are poorly controlled.  Will add on Asteline to his Flonase, continue with nasal rinses, use Mucinex in the meantime. Will start on Augmentin.  If needed can add on oral antihistamine.  Consider steroid burst as well, he will update provider via [employer] if needed through this week or next week.  Encouraged to reschedule with Dr. Triana in the meantime   - azelastine (ASTELIN) 0.1 % nasal spray; Spray 1 spray into both nostrils 2 times daily  - amoxicillin-clavulanate (AUGMENTIN) 875-125 MG tablet; Take 1 tablet by mouth 2 times daily for 7 days      No follow-ups on file.    Options for treatment and follow-up care were reviewed with the patient and/or guardian. Patient and/or guardian engaged in the decision making process and verbalized understanding of the options discussed and agreed with the final plan.    Tiny Angel PA-C  [facility]   Guillermo is a 26 year old who presents for the following health issues    History of Present Illness       Reason for visit:  Sinus infection  Symptom onset:  1-3 days ago  Symptoms include:  Constant pressure  Symptom intensity:  Severe  Symptom progression:  Worsening  Had these symptoms before:  Yes  Has tried/received treatment for these symptoms:  Yes  Previous treatment was successful:  No    He eats 2-3 servings of fruits and vegetables daily.He consumes 3 sweetened beverage(s) daily.He exercises with enough effort to increase his heart rate 9 or less minutes per day.  He exercises  with enough effort to increase his heart rate 7 days per week.   He is taking medications regularly.     Sinus infections happening monthly at this time.   Still using nasal steroid and nasal saline rinses.   Has issues with allergies as well.   He thinks his allergies are triggering more sinus infection.   He was supposed to see Dr. Triana last week but forgot about it at that time.    Did have surgery and it helped his breathing but allergies are causing symptoms and aren't really improved with anything at this time.   Has had congestion for the last week. Saturday and Sunday started to get pretty bad   Hasn't been on antibiotics in over a year at this point.         Review of Systems   Constitutional, HEENT, cardiovascular, pulmonary, gi and gu systems are negative, except as otherwise noted.      Objective           Vitals:  No vitals were obtained today due to virtual visit.    Physical Exam   GENERAL: Healthy, alert and no distress  EYES: Eyes grossly normal to inspection.  No discharge or erythema, or obvious scleral/conjunctival abnormalities.  RESP: No audible wheeze, cough, or visible cyanosis.  No visible retractions or increased work of breathing.    SKIN: Visible skin clear. No significant rash, abnormal pigmentation or lesions.  NEURO: Cranial nerves grossly intact.  Mentation and speech appropriate for age.  PSYCH: Mentation appears normal, affect normal/bright, judgement and insight intact, normal speech and appearance well-groomed.      Video-Visit Details    Type of service:  Video Visit    Video End Time:3:46 PM    Originating Location (pt. Location): Home    Distant Location (provider location):  [facility]     Platform used for Video Visit: [place]

## 2022-10-09 ENCOUNTER — HEALTH MAINTENANCE LETTER (OUTPATIENT)
Age: 27
End: 2022-10-09

## 2023-02-22 ENCOUNTER — OFFICE VISIT (OUTPATIENT)
Dept: OTOLARYNGOLOGY | Facility: OTHER | Age: 28
End: 2023-02-22
Payer: COMMERCIAL

## 2023-02-22 VITALS
HEIGHT: 76 IN | SYSTOLIC BLOOD PRESSURE: 138 MMHG | TEMPERATURE: 97.1 F | WEIGHT: 306 LBS | DIASTOLIC BLOOD PRESSURE: 80 MMHG | BODY MASS INDEX: 37.26 KG/M2

## 2023-02-22 DIAGNOSIS — J32.8 OTHER CHRONIC SINUSITIS: Primary | ICD-10-CM

## 2023-02-22 PROCEDURE — 99213 OFFICE O/P EST LOW 20 MIN: CPT | Mod: 25 | Performed by: OTOLARYNGOLOGY

## 2023-02-22 PROCEDURE — 31231 NASAL ENDOSCOPY DX: CPT | Performed by: OTOLARYNGOLOGY

## 2023-02-22 RX ORDER — DOXYCYCLINE 100 MG/1
100 CAPSULE ORAL 2 TIMES DAILY
Qty: 60 CAPSULE | Refills: 0 | Status: SHIPPED | OUTPATIENT
Start: 2023-02-22 | End: 2023-03-24

## 2023-02-22 ASSESSMENT — PAIN SCALES - GENERAL: PAINLEVEL: NO PAIN (0)

## 2023-02-22 NOTE — PROGRESS NOTES
History of Present Illness - Guillermo Terrell is a 27 year old male presenting in clinic today for a recheck on Patient presents with:  Follow Up: Sinus, septoplasty 5/11/21    Patient previously had septoplasty submucous resection of turbinates couple years ago but since has had some issues with his sinuses.  He tested positive to a lot of allergens.  Still uses fluticasone daily with some relief of allergies but continues to have recurrent sinus infections.  Gets them at least 6 times a year.  He sinus infections manifest with headaches pressure yellowish-greenish discharge.  Just recently he was on a course of prednisone pack.  He has not been on antibiotics for a while.  Sense of smell and taste appear to be intact.  He can overall breathe through his nose.        BP Readings from Last 1 Encounters:   02/22/23 138/80       BP noted to be well controlled today in office.     Guillermo IS NOT a smoker/uses chewing tobacco.     Past Medical History -   Past Medical History:   Diagnosis Date     Lyme disease 2000       Current Medications -   Current Outpatient Medications:      fluticasone (FLONASE) 50 MCG/ACT nasal spray, Spray 1 spray into both nostrils daily, Disp: , Rfl:      Acetaminophen (TYLENOL PO), Take  by mouth. (Patient not taking: Reported on 2/22/2023), Disp: , Rfl:      Pseudoephedrine-guaiFENesin (MUCINEX D PO), , Disp: , Rfl:     Allergies -   Allergies   Allergen Reactions     Lorabid [Cephalosporins] Rash     When he was around [age] year old.       Social History -   Social History     Socioeconomic History     Marital status: Single   Tobacco Use     Smoking status: Former     Types: Cigarettes     Smokeless tobacco: Current     Types: Chew     Tobacco comments:     sometimes   Substance and Sexual Activity     Alcohol use: Yes     Drug use: Not Currently     Types: Marijuana     Comment: not for over a year 11/24/2020     Sexual activity: Yes       Family History -   Family History   Problem  "Relation Age of Onset     Breast Cancer Maternal Grandmother      Circulatory Paternal Grandmother         Annuersym     Diabetes Mother         Type 1     Diabetes Other         paternal cousin Type 1       Review of Systems - As per HPI and PMHx, otherwise review of system review of the head and neck negative. Otherwise 10+ review of system is negative    Physical Exam  /80   Temp 97.1  F (36.2  C) (Temporal)   Ht 1.93 m (6' 4\")   Wt 138.8 kg (306 lb)   BMI 37.25 kg/m    BMI: Body mass index is 37.25 kg/m .    General - The patient is well nourished and well developed, and appears to have good nutritional status.  Alert and oriented to person and place, answers questions and cooperates with examination appropriately.    SKIN - No suspicious lesions or rashes.  Respiration - No respiratory distress.  Head and Face - Normocephalic and atraumatic, with no gross asymmetry noted of the contour of the facial features.  The facial nerve is intact, with strong symmetric movements.    Voice and Breathing - The patient was breathing comfortably without the use of accessory muscles. The patients voice was clear and strong, and had appropriate pitch and quality.    Ears - Bilateral pinna and EACs with normal appearing overlying skin. Tympanic membrane intact with good mobility on pneumatic otoscopy bilaterally. Bony landmarks of the ossicular chain are normal. The tympanic membranes are normal in appearance. No retraction, perforation, or masses.  No fluid or purulence was seen in the external canal or the middle ear.     Eyes - Extraocular movements intact.  Sclera were not icteric or injected, conjunctiva were pink and moist.    Mouth - Examination of the oral cavity showed pink, healthy oral mucosa. No lesions or ulcerations noted.  The tongue was mobile and midline, and the dentition were in good condition.      Throat - The walls of the oropharynx were smooth, pink, moist, symmetric, and had no lesions or " ulcerations.  The tonsillar pillars and soft palate were symmetric. . The uvula was midline on elevation.    Neck - Normal midline excursion of the laryngotracheal complex during swallowing.  Full range of motion on passive movement.  Palpation of the occipital, submental, submandibular, internal jugular chain, and supraclavicular nodes did not demonstrate any abnormal lymph nodes or masses.  The carotid pulse was palpable bilaterally.  Palpation of the thyroid was soft and smooth, with no nodules or goiter appreciated.  The trachea was mobile and midline.    Nose - External contour is symmetric, no gross deflection or scars.  Nasal mucosa is pink and moist with no abnormal mucus.  The septum was midline and non-obstructive, turbinates of normal size on the right and enlarged on the left .  No polyps, masses, or purulence noted on examination.    Neuro - Nonfocal neuro exam is normal, CN 2 through 12 intact, normal gait and muscle tone.      Performed in clinic today:  To further evaluate the nasal cavity, I performed rigid nasal endoscopy.  I first sprayed the nasal cavity bilaterally with a mix of lidocaine and neosynephrine.  I then began on the left side using a 2.7mm, 30 degree rigid nasal endoscope.  The septum was straight and the nasal airway was open.  No abnormal secretions, purulence, or polyps were noted. The left middle turbinate and middle meatus were clearly visualized and normal in appearance.  Looking up, the olfactory cleft was unobstructed.  Going further back, the sphenoethmoid recess was normal in appearance, with healthy appearing mucosa on the face of the sphenoid.  The nasopharynx was unremarkable, and the eustachian tube opening on this side was unobstructed.    I then turned my attention to the right side.  Once again, the septum was straight, and the airway was open.  No abnormal secretions, purulence, polyps were noted.  The right middle turbinate and middle meatus were clearly visualized  and normal in appearance.  Looking up, the olfactory cleft was unobstructed.  Going further back the right sphenoethmoid recess was normal in appearance, and eustachian tube opening was unobstructed.   Black - [id number] [facility]      A/P - Guillermo Terrell is a 27 year old male Patient presents with:  Follow Up: Sinus, septoplasty 5/11/21    This point patient does have recurrent and chronic sinusitis symptoms.  Would like to evaluate further getting a CT scan of the sinuses.  In the meantime we will put him on 1 month course of doxycycline and continue fluticasone.    Guillermo should follow up in 1 month.            Timur Triana MD

## 2023-02-22 NOTE — LETTER
2/22/2023         RE: Guillermo Terrell  [id number] [facility] [id number]        Dear Colleague,    Thank you for referring your patient, Guillermo Terrell, to the [facility]. Please see a copy of my visit note below.    History of Present Illness - Guillermo Terrell is a 27 year old male presenting in clinic today for a recheck on Patient presents with:  Follow Up: Sinus, septoplasty 5/11/21    Patient previously had septoplasty submucous resection of turbinates couple years ago but since has had some issues with his sinuses.  He tested positive to a lot of allergens.  Still uses fluticasone daily with some relief of allergies but continues to have recurrent sinus infections.  Gets them at least 6 times a year.  He sinus infections manifest with headaches pressure yellowish-greenish discharge.  Just recently he was on a course of prednisone pack.  He has not been on antibiotics for a while.  Sense of smell and taste appear to be intact.  He can overall breathe through his nose.        BP Readings from Last 1 Encounters:   02/22/23 138/80       BP noted to be well controlled today in office.     Guillermo IS NOT a smoker/uses chewing tobacco.     Past Medical History -   Past Medical History:   Diagnosis Date     Lyme disease 2000       Current Medications -   Current Outpatient Medications:      fluticasone (FLONASE) 50 MCG/ACT nasal spray, Spray 1 spray into both nostrils daily, Disp: , Rfl:      Acetaminophen (TYLENOL PO), Take  by mouth. (Patient not taking: Reported on 2/22/2023), Disp: , Rfl:      Pseudoephedrine-guaiFENesin (MUCINEX D PO), , Disp: , Rfl:     Allergies -   Allergies   Allergen Reactions     Lorabid [Cephalosporins] Rash     When he was around [age] year old.       Social History -   Social History     Socioeconomic History     Marital status: Single   Tobacco Use     Smoking status: Former     Types: Cigarettes     Smokeless tobacco: Current     Types: Chew     Tobacco  "comments:     sometimes   Substance and Sexual Activity     Alcohol use: Yes     Drug use: Not Currently     Types: Marijuana     Comment: not for over a year 11/24/2020     Sexual activity: Yes       Family History -   Family History   Problem Relation Age of Onset     Breast Cancer Maternal Grandmother      Circulatory Paternal Grandmother         Annuersym     Diabetes Mother         Type 1     Diabetes Other         paternal cousin Type 1       Review of Systems - As per HPI and PMHx, otherwise review of system review of the head and neck negative. Otherwise 10+ review of system is negative    Physical Exam  /80   Temp 97.1  F (36.2  C) (Temporal)   Ht 1.93 m (6' 4\")   Wt 138.8 kg (306 lb)   BMI 37.25 kg/m    BMI: Body mass index is 37.25 kg/m .    General - The patient is well nourished and well developed, and appears to have good nutritional status.  Alert and oriented to person and place, answers questions and cooperates with examination appropriately.    SKIN - No suspicious lesions or rashes.  Respiration - No respiratory distress.  Head and Face - Normocephalic and atraumatic, with no gross asymmetry noted of the contour of the facial features.  The facial nerve is intact, with strong symmetric movements.    Voice and Breathing - The patient was breathing comfortably without the use of accessory muscles. The patients voice was clear and strong, and had appropriate pitch and quality.    Ears - Bilateral pinna and EACs with normal appearing overlying skin. Tympanic membrane intact with good mobility on pneumatic otoscopy bilaterally. Bony landmarks of the ossicular chain are normal. The tympanic membranes are normal in appearance. No retraction, perforation, or masses.  No fluid or purulence was seen in the external canal or the middle ear.     Eyes - Extraocular movements intact.  Sclera were not icteric or injected, conjunctiva were pink and moist.    Mouth - Examination of the oral cavity showed " pink, healthy oral mucosa. No lesions or ulcerations noted.  The tongue was mobile and midline, and the dentition were in good condition.      Throat - The walls of the oropharynx were smooth, pink, moist, symmetric, and had no lesions or ulcerations.  The tonsillar pillars and soft palate were symmetric. . The uvula was midline on elevation.    Neck - Normal midline excursion of the laryngotracheal complex during swallowing.  Full range of motion on passive movement.  Palpation of the occipital, submental, submandibular, internal jugular chain, and supraclavicular nodes did not demonstrate any abnormal lymph nodes or masses.  The carotid pulse was palpable bilaterally.  Palpation of the thyroid was soft and smooth, with no nodules or goiter appreciated.  The trachea was mobile and midline.    Nose - External contour is symmetric, no gross deflection or scars.  Nasal mucosa is pink and moist with no abnormal mucus.  The septum was midline and non-obstructive, turbinates of normal size on the right and enlarged on the left .  No polyps, masses, or purulence noted on examination.    Neuro - Nonfocal neuro exam is normal, CN 2 through 12 intact, normal gait and muscle tone.      Performed in clinic today:  To further evaluate the nasal cavity, I performed rigid nasal endoscopy.  I first sprayed the nasal cavity bilaterally with a mix of lidocaine and neosynephrine.  I then began on the left side using a 2.7mm, 30 degree rigid nasal endoscope.  The septum was straight and the nasal airway was open.  No abnormal secretions, purulence, or polyps were noted. The left middle turbinate and middle meatus were clearly visualized and normal in appearance.  Looking up, the olfactory cleft was unobstructed.  Going further back, the sphenoethmoid recess was normal in appearance, with healthy appearing mucosa on the face of the sphenoid.  The nasopharynx was unremarkable, and the eustachian tube opening on this side was  unobstructed.    I then turned my attention to the right side.  Once again, the septum was straight, and the airway was open.  No abnormal secretions, purulence, polyps were noted.  The right middle turbinate and middle meatus were clearly visualized and normal in appearance.  Looking up, the olfactory cleft was unobstructed.  Going further back the right sphenoethmoid recess was normal in appearance, and eustachian tube opening was unobstructed.   [facility] [id number] [facility]      A/P - Guillermo Terrell is a 27 year old male Patient presents with:  Follow Up: Sinus, septoplasty 5/11/21    This point patient does have recurrent and chronic sinusitis symptoms.  Would like to evaluate further getting a CT scan of the sinuses.  In the meantime we will put him on 1 month course of doxycycline and continue fluticasone.    Guillermo should follow up in 1 month.            Timur Triana MD          Again, thank you for allowing me to participate in the care of your patient.        Sincerely,        Timur Triana MD, MD

## 2023-02-28 ENCOUNTER — HOSPITAL ENCOUNTER (OUTPATIENT)
Dept: CT IMAGING | Facility: CLINIC | Age: 28
Discharge: HOME | End: 2023-02-28
Attending: OTOLARYNGOLOGY | Admitting: OTOLARYNGOLOGY
Payer: COMMERCIAL

## 2023-02-28 DIAGNOSIS — J32.8 OTHER CHRONIC SINUSITIS: ICD-10-CM

## 2023-02-28 PROCEDURE — 70486 CT MAXILLOFACIAL W/O DYE: CPT

## 2023-03-06 NOTE — PROGRESS NOTES
History of Present Illness - Guillermo Terrell is a 27 year old male presenting in clinic today for a recheck on Patient presents with:  RECHECK: sinus    Patient with previous septoplasty and submucous resection inferior turbinates presented with some problems with recurrent sinusitis.  He will get episodes of acute sinusitis symptoms several about 5-6 times a year now.  He was on doxycycline for a month which has helped and currently he is asymptomatic.  He has not been evaluated for allergies.  Patient recently had imaging of his sinuses having overt with the patient.  CT SCAN OF THE PARANASAL SINUSES AND FACE February 28, 2023 3:34 PM      HISTORY: Chronic sinusitis.     TECHNIQUE: Noncontrast axial scans and coronal reformations. Radiation  dose for this scan was reduced using automated exposure control,  adjustment of the mA and/or kV according to patient size, or iterative  reconstruction technique.     COMPARISON: CT sinus dated 12/14/2020.     FINDINGS:     Frontal sinuses: Normal.       Ethmoid sinuses: There is partial opacification of the left ethmoid  air cell. This was not seen previously. The ethmoid sinuses are  otherwise grossly clear.     Sphenoid sinus: Normal.      Right maxillary sinus: Normal. The ostiomeatal unit is patent.      Left maxillary sinus: Small amount of probable mucus is seen along the  dorsal aspect of the left maxillary sinus. Left maxillary sinus is  otherwise grossly clear. There is partial opacification of left  ostiomeatal unit. Left maxillary sinus is otherwise clear.     Nasal septum: Normal and in the midline.      Turbinates and nasal cavity: Normal.      Laminae papyracea, cribriform plate and orbits: Normal.      Additional findings: Visualized mastoid air spaces are clear.  Visualized portions of the calvarium are intact. The orbits are  grossly unremarkable.                                                                      IMPRESSION: Minimal chronic appearing left  ethmoid and mild acute  appearing left maxillary sinus disease. There is partial opacification  left ostiomeatal unit. No other evidence for sinusitis is seen. The  paranasal sinuses are otherwise clear.     BP Readings from Last 1 Encounters:   03/13/23 (!) 148/90       BP noted to be elevated today in office.  Patient to follow up with Primary Care provider regarding elevated blood pressure.    Guillermo IS NOT a smoker/uses chewing tobacco.     Past Medical History -   Past Medical History:   Diagnosis Date     Lyme disease 2000       Current Medications -   Current Outpatient Medications:      doxycycline hyclate (VIBRAMYCIN) 100 MG capsule, Take 1 capsule (100 mg) by mouth 2 times daily for 30 days, Disp: 60 capsule, Rfl: 0     fluticasone (FLONASE) 50 MCG/ACT nasal spray, Spray 1 spray into both nostrils daily, Disp: , Rfl:      Acetaminophen (TYLENOL PO), Take  by mouth. (Patient not taking: Reported on 2/22/2023), Disp: , Rfl:      Pseudoephedrine-guaiFENesin (MUCINEX D PO), , Disp: , Rfl:     Allergies -   Allergies   Allergen Reactions     Lorabid [Cephalosporins] Rash     When he was around [age] year old.       Social History -   Social History     Socioeconomic History     Marital status: Single   Tobacco Use     Smoking status: Former     Types: Cigarettes     Smokeless tobacco: Current     Types: Chew     Tobacco comments:     sometimes   Substance and Sexual Activity     Alcohol use: Yes     Drug use: Not Currently     Types: Marijuana     Comment: not for over a year 11/24/2020     Sexual activity: Yes       Family History -   Family History   Problem Relation Age of Onset     Breast Cancer Maternal Grandmother      Circulatory Paternal Grandmother         Annuersym     Diabetes Mother         Type 1     Diabetes Other         paternal cousin Type 1       Review of Systems - As per HPI and PMHx, otherwise review of system review of the head and neck negative. Otherwise 10+ review of system is  "negative    Physical Exam  BP (!) 148/90   Temp 98.6  F (37  C) (Temporal)   Ht 1.93 m (6' 4\")   Wt 139.8 kg (308 lb 1.6 oz)   BMI 37.50 kg/m    BMI: Body mass index is 37.5 kg/m .    General - The patient is well nourished and well developed, and appears to have good nutritional status.  Alert and oriented to person and place, answers questions and cooperates with examination appropriately.    SKIN - No suspicious lesions or rashes.  Respiration - No respiratory distress.  Head and Face - Normocephalic and atraumatic, with no gross asymmetry noted of the contour of the facial features.  The facial nerve is intact, with strong symmetric movements.    Voice and Breathing - The patient was breathing comfortably without the use of accessory muscles. The patients voice was clear and strong, and had appropriate pitch and quality.    Ears - Bilateral pinna and EACs with normal appearing overlying skin. Tympanic membrane intact with good mobility on pneumatic otoscopy bilaterally. Bony landmarks of the ossicular chain are normal. The tympanic membranes are normal in appearance. No retraction, perforation, or masses.  No fluid or purulence was seen in the external canal or the middle ear.     Eyes - Extraocular movements intact.  Sclera were not icteric or injected, conjunctiva were pink and moist.    Mouth - Examination of the oral cavity showed pink, healthy oral mucosa. No lesions or ulcerations noted.  The tongue was mobile and midline, and the dentition were in good condition.      Throat - The walls of the oropharynx were smooth, pink, moist, symmetric, and had no lesions or ulcerations.  The tonsillar pillars and soft palate were symmetric. The uvula was midline on elevation.    Neck - Normal midline excursion of the laryngotracheal complex during swallowing.  Full range of motion on passive movement.  Palpation of the occipital, submental, submandibular, internal jugular chain, and supraclavicular nodes did not " demonstrate any abnormal lymph nodes or masses.  The carotid pulse was palpable bilaterally.  Palpation of the thyroid was soft and smooth, with no nodules or goiter appreciated.  The trachea was mobile and midline.    Nose - External contour is symmetric, no gross deflection or scars.  Nasal mucosa is pink and moist with no abnormal mucus.  The septum was midline and non-obstructive, turbinates of normal size and position.  No polyps, masses, or purulence noted on examination.    Neuro - Nonfocal neuro exam is normal, CN 2 through 12 intact, normal gait and muscle tone.      Performed in clinic today:  No procedures preformed in clinic today      A/P - Guillermo Terrell is a 27 year old male Patient presents with:  RECHECK: sinus    We discussed all findings which shows a some mild obstruction of the left ostiomeatal unit opacification 1 ethmoid air cell and a small retention cyst posteriorly in the maxillary antrum on the left side.  But no other significant sinus disease appreciated on both sides.  We discussed further treatment options and patient is interested in having full allergy evaluation.  He will be referred to allergy in [place].  Guillermo should follow up in 4 months.            Timur Triana MD

## 2023-03-13 ENCOUNTER — OFFICE VISIT (OUTPATIENT)
Dept: OTOLARYNGOLOGY | Facility: CLINIC | Age: 28
End: 2023-03-13
Payer: COMMERCIAL

## 2023-03-13 VITALS
TEMPERATURE: 98.6 F | DIASTOLIC BLOOD PRESSURE: 90 MMHG | HEIGHT: 76 IN | BODY MASS INDEX: 37.52 KG/M2 | SYSTOLIC BLOOD PRESSURE: 148 MMHG | WEIGHT: 308.1 LBS

## 2023-03-13 DIAGNOSIS — J01.01 ACUTE RECURRENT MAXILLARY SINUSITIS: ICD-10-CM

## 2023-03-13 DIAGNOSIS — J30.9 ALLERGIC RHINITIS, UNSPECIFIED SEASONALITY, UNSPECIFIED TRIGGER: Primary | ICD-10-CM

## 2023-03-13 PROCEDURE — 99213 OFFICE O/P EST LOW 20 MIN: CPT | Performed by: OTOLARYNGOLOGY

## 2023-03-13 NOTE — LETTER
3/13/2023         RE: Guillermo Terrell  [address] [facility] [address]        Dear Colleague,    Thank you for referring your patient, Guillermo Terrell, to the [facility]. Please see a copy of my visit note below.    History of Present Illness - Guillermo Terrell is a 27 year old male presenting in clinic today for a recheck on Patient presents with:  RECHECK: sinus    Patient with previous septoplasty and submucous resection inferior turbinates presented with some problems with recurrent sinusitis.  He will get episodes of acute sinusitis symptoms several about 5-6 times a year now.  He was on doxycycline for a month which has helped and currently he is asymptomatic.  He has not been evaluated for allergies.  Patient recently had imaging of his sinuses having overt with the patient.  CT SCAN OF THE PARANASAL SINUSES AND FACE February 28, 2023 3:34 PM      HISTORY: Chronic sinusitis.     TECHNIQUE: Noncontrast axial scans and coronal reformations. Radiation  dose for this scan was reduced using automated exposure control,  adjustment of the mA and/or kV according to patient size, or iterative  reconstruction technique.     COMPARISON: CT sinus dated 12/14/2020.     FINDINGS:     Frontal sinuses: Normal.       Ethmoid sinuses: There is partial opacification of the left ethmoid  air cell. This was not seen previously. The ethmoid sinuses are  otherwise grossly clear.     Sphenoid sinus: Normal.      Right maxillary sinus: Normal. The ostiomeatal unit is patent.      Left maxillary sinus: Small amount of probable mucus is seen along the  dorsal aspect of the left maxillary sinus. Left maxillary sinus is  otherwise grossly clear. There is partial opacification of left  ostiomeatal unit. Left maxillary sinus is otherwise clear.     Nasal septum: Normal and in the midline.      Turbinates and nasal cavity: Normal.      Laminae papyracea, cribriform plate and orbits: Normal.      Additional  findings: Visualized mastoid air spaces are clear.  Visualized portions of the calvarium are intact. The orbits are  grossly unremarkable.                                                                      IMPRESSION: Minimal chronic appearing left ethmoid and mild acute  appearing left maxillary sinus disease. There is partial opacification  left ostiomeatal unit. No other evidence for sinusitis is seen. The  paranasal sinuses are otherwise clear.     BP Readings from Last 1 Encounters:   03/13/23 (!) 148/90       BP noted to be elevated today in office.  Patient to follow up with Primary Care provider regarding elevated blood pressure.    Guillermo IS NOT a smoker/uses chewing tobacco.     Past Medical History -   Past Medical History:   Diagnosis Date     Lyme disease 2000       Current Medications -   Current Outpatient Medications:      doxycycline hyclate (VIBRAMYCIN) 100 MG capsule, Take 1 capsule (100 mg) by mouth 2 times daily for 30 days, Disp: 60 capsule, Rfl: 0     fluticasone (FLONASE) 50 MCG/ACT nasal spray, Spray 1 spray into both nostrils daily, Disp: , Rfl:      Acetaminophen (TYLENOL PO), Take  by mouth. (Patient not taking: Reported on 2/22/2023), Disp: , Rfl:      Pseudoephedrine-guaiFENesin (MUCINEX D PO), , Disp: , Rfl:     Allergies -   Allergies   Allergen Reactions     Lorabid [Cephalosporins] Rash     When he was around [age] year old.       Social History -   Social History     Socioeconomic History     Marital status: Single   Tobacco Use     Smoking status: Former     Types: Cigarettes     Smokeless tobacco: Current     Types: Chew     Tobacco comments:     sometimes   Substance and Sexual Activity     Alcohol use: Yes     Drug use: Not Currently     Types: Marijuana     Comment: not for over a year 11/24/2020     Sexual activity: Yes       Family History -   Family History   Problem Relation Age of Onset     Breast Cancer Maternal Grandmother      Circulatory Paternal Grandmother          "Annuersym     Diabetes Mother         Type 1     Diabetes Other         paternal cousin Type 1       Review of Systems - As per HPI and PMHx, otherwise review of system review of the head and neck negative. Otherwise 10+ review of system is negative    Physical Exam  BP (!) 148/90   Temp 98.6  F (37  C) (Temporal)   Ht 1.93 m (6' 4\")   Wt 139.8 kg (308 lb 1.6 oz)   BMI 37.50 kg/m    BMI: Body mass index is 37.5 kg/m .    General - The patient is well nourished and well developed, and appears to have good nutritional status.  Alert and oriented to person and place, answers questions and cooperates with examination appropriately.    SKIN - No suspicious lesions or rashes.  Respiration - No respiratory distress.  Head and Face - Normocephalic and atraumatic, with no gross asymmetry noted of the contour of the facial features.  The facial nerve is intact, with strong symmetric movements.    Voice and Breathing - The patient was breathing comfortably without the use of accessory muscles. The patients voice was clear and strong, and had appropriate pitch and quality.    Ears - Bilateral pinna and EACs with normal appearing overlying skin. Tympanic membrane intact with good mobility on pneumatic otoscopy bilaterally. Bony landmarks of the ossicular chain are normal. The tympanic membranes are normal in appearance. No retraction, perforation, or masses.  No fluid or purulence was seen in the external canal or the middle ear.     Eyes - Extraocular movements intact.  Sclera were not icteric or injected, conjunctiva were pink and moist.    Mouth - Examination of the oral cavity showed pink, healthy oral mucosa. No lesions or ulcerations noted.  The tongue was mobile and midline, and the dentition were in good condition.      Throat - The walls of the oropharynx were smooth, pink, moist, symmetric, and had no lesions or ulcerations.  The tonsillar pillars and soft palate were symmetric. The uvula was midline on " elevation.    Neck - Normal midline excursion of the laryngotracheal complex during swallowing.  Full range of motion on passive movement.  Palpation of the occipital, submental, submandibular, internal jugular chain, and supraclavicular nodes did not demonstrate any abnormal lymph nodes or masses.  The carotid pulse was palpable bilaterally.  Palpation of the thyroid was soft and smooth, with no nodules or goiter appreciated.  The trachea was mobile and midline.    Nose - External contour is symmetric, no gross deflection or scars.  Nasal mucosa is pink and moist with no abnormal mucus.  The septum was midline and non-obstructive, turbinates of normal size and position.  No polyps, masses, or purulence noted on examination.    Neuro - Nonfocal neuro exam is normal, CN 2 through 12 intact, normal gait and muscle tone.      Performed in clinic today:  No procedures preformed in clinic today      A/P - Guillermo Terrell is a 27 year old male Patient presents with:  RECHECK: sinus    We discussed all findings which shows a some mild obstruction of the left ostiomeatal unit opacification 1 ethmoid air cell and a small retention cyst posteriorly in the maxillary antrum on the left side.  But no other significant sinus disease appreciated on both sides.  We discussed further treatment options and patient is interested in having full allergy evaluation.  He will be referred to allergy in [place].  Guillermo should follow up in 4 months.            Timur Triana MD            Again, thank you for allowing me to participate in the care of your patient.        Sincerely,        Timur Triana MD, MD

## 2023-05-21 ENCOUNTER — HEALTH MAINTENANCE LETTER (OUTPATIENT)
Age: 28
End: 2023-05-21

## 2023-06-20 ENCOUNTER — OFFICE VISIT (OUTPATIENT)
Dept: ALLERGY | Facility: OTHER | Age: 28
End: 2023-06-20
Payer: COMMERCIAL

## 2023-06-20 VITALS
HEIGHT: 75 IN | WEIGHT: 296.74 LBS | DIASTOLIC BLOOD PRESSURE: 78 MMHG | HEART RATE: 47 BPM | OXYGEN SATURATION: 98 % | BODY MASS INDEX: 36.9 KG/M2 | SYSTOLIC BLOOD PRESSURE: 125 MMHG

## 2023-06-20 DIAGNOSIS — J30.1 SEASONAL ALLERGIC RHINITIS DUE TO POLLEN: Primary | ICD-10-CM

## 2023-06-20 DIAGNOSIS — H10.13 ALLERGIC CONJUNCTIVITIS OF BOTH EYES: ICD-10-CM

## 2023-06-20 DIAGNOSIS — J01.01 ACUTE RECURRENT MAXILLARY SINUSITIS: ICD-10-CM

## 2023-06-20 LAB
BASOPHILS # BLD AUTO: 0 10E3/UL (ref 0–0.2)
BASOPHILS NFR BLD AUTO: 0 %
EOSINOPHIL # BLD AUTO: 0.2 10E3/UL (ref 0–0.7)
EOSINOPHIL NFR BLD AUTO: 2 %
ERYTHROCYTE [DISTWIDTH] IN BLOOD BY AUTOMATED COUNT: 12.5 % (ref 10–15)
HCT VFR BLD AUTO: 44.5 % (ref 40–53)
HGB BLD-MCNC: 14.6 G/DL (ref 13.3–17.7)
IMM GRANULOCYTES # BLD: 0 10E3/UL
IMM GRANULOCYTES NFR BLD: 0 %
LYMPHOCYTES # BLD AUTO: 2.6 10E3/UL (ref 0.8–5.3)
LYMPHOCYTES NFR BLD AUTO: 37 %
MCH RBC QN AUTO: 29.7 PG (ref 26.5–33)
MCHC RBC AUTO-ENTMCNC: 32.8 G/DL (ref 31.5–36.5)
MCV RBC AUTO: 91 FL (ref 78–100)
MONOCYTES # BLD AUTO: 0.5 10E3/UL (ref 0–1.3)
MONOCYTES NFR BLD AUTO: 8 %
NEUTROPHILS # BLD AUTO: 3.7 10E3/UL (ref 1.6–8.3)
NEUTROPHILS NFR BLD AUTO: 53 %
PLATELET # BLD AUTO: 267 10E3/UL (ref 150–450)
RBC # BLD AUTO: 4.91 10E6/UL (ref 4.4–5.9)
WBC # BLD AUTO: 7 10E3/UL (ref 4–11)

## 2023-06-20 PROCEDURE — 82785 ASSAY OF IGE: CPT | Performed by: ALLERGY & IMMUNOLOGY

## 2023-06-20 PROCEDURE — 36415 COLL VENOUS BLD VENIPUNCTURE: CPT | Performed by: ALLERGY & IMMUNOLOGY

## 2023-06-20 PROCEDURE — 86003 ALLG SPEC IGE CRUDE XTRC EA: CPT | Performed by: ALLERGY & IMMUNOLOGY

## 2023-06-20 PROCEDURE — 99214 OFFICE O/P EST MOD 30 MIN: CPT | Performed by: ALLERGY & IMMUNOLOGY

## 2023-06-20 PROCEDURE — 85025 COMPLETE CBC W/AUTO DIFF WBC: CPT | Performed by: ALLERGY & IMMUNOLOGY

## 2023-06-20 RX ORDER — BUDESONIDE 0.5 MG/2ML
INHALANT ORAL
Qty: 120 ML | Refills: 2 | Status: SHIPPED | OUTPATIENT
Start: 2023-06-20 | End: 2024-09-12

## 2023-06-20 RX ORDER — AZELASTINE HYDROCHLORIDE 0.5 MG/ML
1 SOLUTION/ DROPS OPHTHALMIC 2 TIMES DAILY PRN
Qty: 6 ML | Refills: 3 | Status: SHIPPED | OUTPATIENT
Start: 2023-06-20 | End: 2024-09-12

## 2023-06-20 RX ORDER — AZELASTINE 1 MG/ML
2 SPRAY, METERED NASAL 2 TIMES DAILY PRN
Qty: 30 ML | Refills: 3 | Status: SHIPPED | OUTPATIENT
Start: 2023-06-20 | End: 2024-09-12

## 2023-06-20 ASSESSMENT — ENCOUNTER SYMPTOMS
EYE DISCHARGE: 0
EYE ITCHING: 1
FEVER: 0
FACIAL SWELLING: 0
NAUSEA: 0
VOMITING: 0
DIARRHEA: 0
CHEST TIGHTNESS: 0
ADENOPATHY: 0
RHINORRHEA: 0
ACTIVITY CHANGE: 0
EYE REDNESS: 0
FATIGUE: 0
COUGH: 0
WHEEZING: 0
STRIDOR: 0
SHORTNESS OF BREATH: 0
HEADACHES: 0
SINUS PRESSURE: 1

## 2023-06-20 NOTE — LETTER
6/20/2023         RE: Guillermo Terrell  [id number] [facility] [address]        Dear Colleague,    Thank you for referring your patient, Guillermo Terrell, to the [facility]. Please see a copy of my visit note below.    SUBJECTIVE:                                                                   Guillermo Terrell is a 27 year old male who presents today to our Allergy Clinic at [facility]; He is being seen in consultation at the request of Dr. Timur Triana for an evaluation of allergic rhinitis and sinusitis.     The onset of symptoms 5-6 ago.   Perennial but seasonally-exacerbated (Spring and Fall) chronic nasal symptoms (ichy nose, clear-green rhinorrhea, morning stuffiness, and sneezing), postnasal drip and ocular symptoms (itching and watering). Ocular symptoms are mild.   He is not worse around dogs or cats. He uses intranasal fluticasone 1-2 sprays in each nostril consistently, but that doesn't seem to help. He takes off-brand fexofenadine. Not sure if that helps. Takes Mucinex-D, and it helps.    There is no history of PE tubes or sinus surgeries. He had tonsillectomy/adenoidectomy in the past and septoplasty.   He had SPT with Dr. Barker in 2020. I reviewed and interpreted the results.  SPT was positive for pollen of Maple/box elder and white olinda. Serum IgE with mild sensitivity to grass pollen, ragweed, and dust mites.   CT SCAN OF THE PARANASAL SINUSES AND FACE February 28, 2023 3:34 PM      HISTORY: Chronic sinusitis.     TECHNIQUE: Noncontrast axial scans and coronal reformations. Radiation  dose for this scan was reduced using automated exposure control,  adjustment of the mA and/or kV according to patient size, or iterative  reconstruction technique.     COMPARISON: CT sinus dated 12/14/2020.     FINDINGS:     Frontal sinuses: Normal.       Ethmoid sinuses: There is partial opacification of the left ethmoid  air cell. This was not seen previously. The  ethmoid sinuses are  otherwise grossly clear.     Sphenoid sinus: Normal.      Right maxillary sinus: Normal. The ostiomeatal unit is patent.      Left maxillary sinus: Small amount of probable mucus is seen along the  dorsal aspect of the left maxillary sinus. Left maxillary sinus is  otherwise grossly clear. There is partial opacification of left  ostiomeatal unit. Left maxillary sinus is otherwise clear.     Nasal septum: Normal and in the midline.      Turbinates and nasal cavity: Normal.      Laminae papyracea, cribriform plate and orbits: Normal.      Additional findings: Visualized mastoid air spaces are clear.  Visualized portions of the calvarium are intact. The orbits are  grossly unremarkable.                                                                   IMPRESSION: Minimal chronic appearing left ethmoid and mild acute  appearing left maxillary sinus disease. There is partial opacification  left ostiomeatal unit. No other evidence for sinusitis is seen. The  paranasal sinuses are otherwise clear.     VIOLET BREWER MD       Patient Active Problem List   Diagnosis     Acne     Attention deficit hyperactivity disorder (ADHD), predominantly inattentive type     Hyperhidrosis of axilla     Obesity     Elevated blood pressure reading without diagnosis of hypertension     Seasonal allergic rhinitis due to pollen     Deviated nasal septum     Hypertrophy of both inferior nasal turbinates       Past Medical History:   Diagnosis Date     Lyme disease 2000      Problem (# of Occurrences) Relation (Name,Age of Onset)    Circulatory (1) Paternal Grandmother: Annuersym    Diabetes (2) Mother: Type 1, Other: paternal cousin Type 1    Breast Cancer (1) Maternal Grandmother       Negative family history of: Asthma        Past Surgical History:   Procedure Laterality Date     ENDOSCOPIC BALLOON SINUPLASTY ACCLARENT Bilateral 5/11/2021    Procedure: ENDOSCOPIC BALLOON SINUPLASTY ACCLARENT;  Surgeon: Timur Triana  MD;  Location: [facility] OR     SEPTOPLASTY, TURBINOPLASTY, COMBINED Bilateral 5/11/2021    Procedure: SEPTOPLASTY, NOSE, WITH submucous resection of turbinates;  Surgeon: Timur Triana MD;  Location: [facility] OR     TONSILLECTOMY & ADENOIDECTOMY  12/06     Social History     Socioeconomic History     Marital status: Single     Spouse name: None     Number of children: None     Years of education: None     Highest education level: None   Occupational History     Occupation: heating and air   Tobacco Use     Smoking status: Former     Types: Cigarettes     Smokeless tobacco: Current     Types: Chew     Tobacco comments:     sometimes   Vaping Use     Vaping status: Former   Substance and Sexual Activity     Alcohol use: Yes     Drug use: Not Currently     Types: Marijuana     Comment: not for over a year 11/24/2020     Sexual activity: Yes   Social History Narrative    6/20/23    ENVIRONMENTAL HISTORY: [place] is heated with a forced air. They do have central air conditioning. The patient's bedroom is furnished with feather/wool bedding or pillows and carpeting in bedroom.  Pets inside the house include 0 pets. There is no history of cockroach or mice infestation. There is/are 0 smokers in the house.  The house does not have a damp basement.            Review of Systems   Constitutional: Negative for activity change, fatigue and fever.   HENT: Positive for sinus pressure. Negative for congestion, ear pain, facial swelling, nosebleeds, postnasal drip, rhinorrhea and sneezing.    Eyes: Positive for itching. Negative for discharge and redness.   Respiratory: Negative for cough, chest tightness, shortness of breath, wheezing and stridor.    Gastrointestinal: Negative for diarrhea, nausea and vomiting.   Skin: Negative for rash.   Allergic/Immunologic: Positive for environmental allergies.   Neurological: Negative for headaches.   Hematological: Negative for adenopathy.  "  Psychiatric/Behavioral: Negative for self-injury.           Current Outpatient Medications:      Acetaminophen (TYLENOL PO), , Disp: , Rfl:      azelastine (ASTELIN) 0.1 % nasal spray, Spray 2 sprays into both nostrils 2 times daily as needed for rhinitis, Disp: 30 mL, Rfl: 3     azelastine (OPTIVAR) 0.05 % ophthalmic solution, Apply 1 drop to eye 2 times daily as needed (itchy/watery eyes), Disp: 6 mL, Rfl: 3     budesonide (PULMICORT) 0.5 MG/2ML neb solution, Mix respule of 0.5mg/2 mL budesonide vial in 8oz normal saline sinus rinse bottle. Irrigate each nostril with half of the bottle twice daily, Disp: 120 mL, Rfl: 2     fluticasone (FLONASE) 50 MCG/ACT nasal spray, Spray 1 spray into both nostrils daily, Disp: , Rfl:      Pseudoephedrine-guaiFENesin (MUCINEX D PO), , Disp: , Rfl:   Immunization History   Administered Date(s) Administered     DTAP (<7y) 10/15/1997, 06/08/2001     HIB (PRP-T) 10/15/1997     HPV Quadrivalent 06/25/2013, 09/17/2013, 03/27/2014     HepB 1995, 01/30/1996, 12/17/1996     Influenza (H1N1) 01/19/2010     Influenza (IIV3) PF 10/09/1998, 11/02/2005, 10/30/2006     Influenza Intranasal Vaccine 09/28/2010, 11/22/2011     MMR 12/17/1996, 06/08/2001     Meningococcal ACWY (Menactra ) 06/20/2008     Meningococcal ACWY (Menveo ) 06/25/2013     OPV, trivalent, live 01/30/1996, 04/24/1996, 08/14/1996     Polio, Unspecified  01/30/1996, 04/24/1996, 08/14/1996     Poliovirus, inactivated (IPV) 06/08/2001     TDAP Vaccine (Boostrix) 06/20/2008     Tetramune (DtP/HIB) 01/30/1996, 04/24/1996, 08/14/1996     Varicella 12/17/1996, 06/20/2008     Allergies   Allergen Reactions     Lorabid [Cephalosporins] Rash     When he was around [age] year old.     OBJECTIVE:                                                                 /78   Pulse (!) 47   Ht 1.916 m (6' 3.43\")   Wt 134.6 kg (296 lb 11.8 oz)   SpO2 98%   BMI 36.67 kg/m          Physical Exam  Vitals and nursing note reviewed. "   Constitutional:       General: He is not in acute distress.     Appearance: He is not diaphoretic.   HENT:      Head: Normocephalic and atraumatic.      Right Ear: Tympanic membrane, ear canal and external ear normal.      Left Ear: Tympanic membrane, ear canal and external ear normal.      Nose: No mucosal edema or rhinorrhea.      Right Turbinates: Not enlarged, swollen or pale.      Left Turbinates: Not enlarged, swollen or pale.      Mouth/Throat:      Lips: Pink.      Mouth: Mucous membranes are moist.      Pharynx: Oropharynx is clear. No pharyngeal swelling, oropharyngeal exudate or posterior oropharyngeal erythema.   Eyes:      General:         Right eye: No discharge.         Left eye: No discharge.      Conjunctiva/sclera: Conjunctivae normal.   Cardiovascular:      Rate and Rhythm: Normal rate and regular rhythm.      Heart sounds: Normal heart sounds. No murmur heard.  Pulmonary:      Effort: Pulmonary effort is normal. No respiratory distress.      Breath sounds: Normal breath sounds and air entry. No stridor, decreased air movement or transmitted upper airway sounds. No decreased breath sounds, wheezing, rhonchi or rales.   Musculoskeletal:         General: Normal range of motion.   Skin:     General: Skin is warm.   Neurological:      Mental Status: He is alert and oriented to person, place, and time.   Psychiatric:         Mood and Affect: Mood normal.         Behavior: Behavior normal.         ASSESSMENT/PLAN:    Seasonal allergic rhinitis due to pollen  Acute recurrent maxillary sinusitis  Allergic conjunctivitis of both eyes    Considering that the previous SPT was not very informative, I ordered serum IgE for the regional aeroallergen panel.  - Intranasal fluticasone has not been very effective.  I recommend stopping it.  - Start budesonide/saline irrigations twice daily.  - Add azelastine 2 sprays in each nostril twice daily as needed.  - Use Optivar 1 drop in each eye twice daily as  needed.  Depending on the results, consider allergen immunotherapy.    - IgE  - Allergen cat epithellium IgE  - Allergen dog epithelium IgE  - Allergen Hardeep grass IgE  - Allergen orchard grass IgE  - Allergen safia IgE  - Allergen D farinae IgE  - Allergen D pteronyssinus IgE  - Allergen alternaria alternata IgE  - Allergen aspergillus fumigatus IgE  - Allergen cladosporium herbarum IgE  - Allergen Epicoccum purpurascens IgE  - Allergen penicillium notatum IgE  - Allergen olinda white IgE  - Allergen Cedar IgE  - Allergen cottonwood IgE  - Allergen elm IgE  - Allergen maple box elder IgE  - Allergen oak white IgE  - Allergen Red Woburn IgE  - Allergen silver  birch IgE  - Allergen Tree White [place]oburn IgE  - Allergen Bowie Tree  - Allergen white pine IgE  - Allergen English plantain IgE  - Allergen giant ragweed IgE  - Allergen lamb's quarter IgE  - Allergen Mugwort IgE  - Allergen ragweed short IgE  - Allergen Sagebrush Wormwood IgE  - Allergen Sheep Sorrel IgE  - Allergen thistle Russian IgE  - Allergen Weed Nettle IgE  - Allergen, Kochia/Firebush  - CBC with Platelets & Differential  - azelastine (ASTELIN) 0.1 % nasal spray  Dispense: 30 mL; Refill: 3  - budesonide (PULMICORT) 0.5 MG/2ML neb solution  Dispense: 120 mL; Refill: 2  - azelastine (OPTIVAR) 0.05 % ophthalmic solution  Dispense: 6 mL; Refill: 3       Return in about 6 weeks (around 8/1/2023), or if symptoms worsen or fail to improve.    Thank you for allowing us to participate in the care of this patient. Please feel free to contact us if there are any questions or concerns about the patient.    Disclaimer: This note consists of symbols derived from keyboarding, dictation and/or voice recognition software. As a result, there may be errors in the script that have gone undetected. Please consider this when interpreting information found in this chart.    Guillermo Leos MD, FAAAAI, FACAAI  Allergy, Asthma and Immunology     [facility]  [facility]        Again, thank you for allowing me to participate in the care of your patient.        Sincerely,        Guillermo Leos MD

## 2023-06-20 NOTE — PATIENT INSTRUCTIONS
Get the bloodwork done.     Stop Flonase.   -Use azelastine 2 sprays in each nostril twice a day when necessary.   Start budesonide/saline irrigations twice daily.     Optivar 1 drop in each eye twice daily as needed.     BUDESONIDE IRRIGATION INSTRUCTIONS     You will be starting Budesonide nasal irrigations and will need to obtain the following:       - NeilMed Sinus Rinse 8 oz Kit  - Distilled or filtered water   - Normal saline salt packets  - Budesonide medication      Place filtered or distilled water into the NeilMed bottle up to the fill line (DO NOT USE TAP OR WELL WATER).  Place the pre-made salt packet in the 8 oz of saline.  Then placed the budesonide medication into the bottle.  Shake the bottle to suspend into solution.  Lean head forward over a sink or a basin.  Rinse each side of the nose with one-half of the bottle (each squeeze is about one-half of the bottle). Rinse the nose twice daily.         Video example: https://www.youtube.com/watch?[email or web address]       Dr Leos Scheduling:  [facility] (Tues / Wed) appointment line: [phone]  [place] allergy shot room: [phone]  [facility] (Thurs / Fri) appointment line: [phone]    Pulmonary Function Scheduling:  [place] - [phone]  [facility] [phone]  [place] - [phone]     Prescription Assistance  If you need assistance with your prescriptions (cost, coverage, etc) please contact: [facility] [phone]

## 2023-06-20 NOTE — PROGRESS NOTES
SUBJECTIVE:                                                                   Guillermo Terrell is a 27 year old male who presents today to our Allergy Clinic at [facility]; He is being seen in consultation at the request of Dr. Timur Triana for an evaluation of allergic rhinitis and sinusitis.     The onset of symptoms 5-6 ago.   Perennial but seasonally-exacerbated (Spring and Fall) chronic nasal symptoms (ichy nose, clear-green rhinorrhea, morning stuffiness, and sneezing), postnasal drip and ocular symptoms (itching and watering). Ocular symptoms are mild.   He is not worse around dogs or cats. He uses intranasal fluticasone 1-2 sprays in each nostril consistently, but that doesn't seem to help. He takes off-brand fexofenadine. Not sure if that helps. Takes Mucinex-D, and it helps.    There is no history of PE tubes or sinus surgeries. He had tonsillectomy/adenoidectomy in the past and septoplasty.   He had SPT with Dr. Barker in 2020. I reviewed and interpreted the results.  SPT was positive for pollen of Maple/box elder and white olinda. Serum IgE with mild sensitivity to grass pollen, ragweed, and dust mites.   CT SCAN OF THE PARANASAL SINUSES AND FACE February 28, 2023 3:34 PM      HISTORY: Chronic sinusitis.     TECHNIQUE: Noncontrast axial scans and coronal reformations. Radiation  dose for this scan was reduced using automated exposure control,  adjustment of the mA and/or kV according to patient size, or iterative  reconstruction technique.     COMPARISON: CT sinus dated 12/14/2020.     FINDINGS:     Frontal sinuses: Normal.       Ethmoid sinuses: There is partial opacification of the left ethmoid  air cell. This was not seen previously. The ethmoid sinuses are  otherwise grossly clear.     Sphenoid sinus: Normal.      Right maxillary sinus: Normal. The ostiomeatal unit is patent.      Left maxillary sinus: Small amount of probable mucus is seen along the  dorsal aspect of the left  maxillary sinus. Left maxillary sinus is  otherwise grossly clear. There is partial opacification of left  ostiomeatal unit. Left maxillary sinus is otherwise clear.     Nasal septum: Normal and in the midline.      Turbinates and nasal cavity: Normal.      Laminae papyracea, cribriform plate and orbits: Normal.      Additional findings: Visualized mastoid air spaces are clear.  Visualized portions of the calvarium are intact. The orbits are  grossly unremarkable.                                                                   IMPRESSION: Minimal chronic appearing left ethmoid and mild acute  appearing left maxillary sinus disease. There is partial opacification  left ostiomeatal unit. No other evidence for sinusitis is seen. The  paranasal sinuses are otherwise clear.     VIOLET BREWER MD       Patient Active Problem List   Diagnosis     Acne     Attention deficit hyperactivity disorder (ADHD), predominantly inattentive type     Hyperhidrosis of axilla     Obesity     Elevated blood pressure reading without diagnosis of hypertension     Seasonal allergic rhinitis due to pollen     Deviated nasal septum     Hypertrophy of both inferior nasal turbinates       Past Medical History:   Diagnosis Date     Lyme disease 2000      Problem (# of Occurrences) Relation (Name,Age of Onset)    Circulatory (1) Paternal Grandmother: Annuersym    Diabetes (2) Mother: Type 1, Other: paternal cousin Type 1    Breast Cancer (1) Maternal Grandmother       Negative family history of: Asthma        Past Surgical History:   Procedure Laterality Date     ENDOSCOPIC BALLOON SINUPLASTY ACCLARENT Bilateral 5/11/2021    Procedure: ENDOSCOPIC BALLOON SINUPLASTY ACCLARENT;  Surgeon: Timur Triana MD;  Location: [facility] OR     SEPTOPLASTY, TURBINOPLASTY, COMBINED Bilateral 5/11/2021    Procedure: SEPTOPLASTY, NOSE, WITH submucous resection of turbinates;  Surgeon: Timur Triana MD;  Location: [facility] OR     TONSILLECTOMY & ADENOIDECTOMY  12/06      Social History     Socioeconomic History     Marital status: Single     Spouse name: None     Number of children: None     Years of education: None     Highest education level: None   Occupational History     Occupation: heating and air   Tobacco Use     Smoking status: Former     Types: Cigarettes     Smokeless tobacco: Current     Types: Chew     Tobacco comments:     sometimes   Vaping Use     Vaping status: Former   Substance and Sexual Activity     Alcohol use: Yes     Drug use: Not Currently     Types: Marijuana     Comment: not for over a year 11/24/2020     Sexual activity: Yes   Social History Narrative    6/20/23    ENVIRONMENTAL HISTORY: [place] is heated with a forced air. They do have central air conditioning. The patient's bedroom is furnished with feather/wool bedding or pillows and carpeting in bedroom.  Pets inside the house include 0 pets. There is no history of cockroach or mice infestation. There is/are 0 smokers in the house.  The house does not have a damp basement.            Review of Systems   Constitutional: Negative for activity change, fatigue and fever.   HENT: Positive for sinus pressure. Negative for congestion, ear pain, facial swelling, nosebleeds, postnasal drip, rhinorrhea and sneezing.    Eyes: Positive for itching. Negative for discharge and redness.   Respiratory: Negative for cough, chest tightness, shortness of breath, wheezing and stridor.    Gastrointestinal: Negative for diarrhea, nausea and vomiting.   Skin: Negative for rash.   Allergic/Immunologic: Positive for environmental allergies.   Neurological: Negative for headaches.   Hematological: Negative for adenopathy.   Psychiatric/Behavioral: Negative for self-injury.           Current Outpatient Medications:      Acetaminophen (TYLENOL PO), , Disp: , Rfl:      azelastine (ASTELIN) 0.1 % nasal spray, Spray 2 sprays into both nostrils 2 times daily as needed for  "rhinitis, Disp: 30 mL, Rfl: 3     azelastine (OPTIVAR) 0.05 % ophthalmic solution, Apply 1 drop to eye 2 times daily as needed (itchy/watery eyes), Disp: 6 mL, Rfl: 3     budesonide (PULMICORT) 0.5 MG/2ML neb solution, Mix respule of 0.5mg/2 mL budesonide vial in 8oz normal saline sinus rinse bottle. Irrigate each nostril with half of the bottle twice daily, Disp: 120 mL, Rfl: 2     fluticasone (FLONASE) 50 MCG/ACT nasal spray, Spray 1 spray into both nostrils daily, Disp: , Rfl:      Pseudoephedrine-guaiFENesin (MUCINEX D PO), , Disp: , Rfl:   Immunization History   Administered Date(s) Administered     DTAP (<7y) 10/15/1997, 06/08/2001     HIB (PRP-T) 10/15/1997     HPV Quadrivalent 06/25/2013, 09/17/2013, 03/27/2014     HepB 1995, 01/30/1996, 12/17/1996     Influenza (H1N1) 01/19/2010     Influenza (IIV3) PF 10/09/1998, 11/02/2005, 10/30/2006     Influenza Intranasal Vaccine 09/28/2010, 11/22/2011     MMR 12/17/1996, 06/08/2001     Meningococcal ACWY (Menactra ) 06/20/2008     Meningococcal ACWY (Menveo ) 06/25/2013     OPV, trivalent, live 01/30/1996, 04/24/1996, 08/14/1996     Polio, Unspecified  01/30/1996, 04/24/1996, 08/14/1996     Poliovirus, inactivated (IPV) 06/08/2001     TDAP Vaccine (Boostrix) 06/20/2008     Tetramune (DtP/HIB) 01/30/1996, 04/24/1996, 08/14/1996     Varicella 12/17/1996, 06/20/2008     Allergies   Allergen Reactions     Lorabid [Cephalosporins] Rash     When he was around [age] year old.     OBJECTIVE:                                                                 /78   Pulse (!) 47   Ht 1.916 m (6' 3.43\")   Wt 134.6 kg (296 lb 11.8 oz)   SpO2 98%   BMI 36.67 kg/m          Physical Exam  Vitals and nursing note reviewed.   Constitutional:       General: He is not in acute distress.     Appearance: He is not diaphoretic.   HENT:      Head: Normocephalic and atraumatic.      Right Ear: Tympanic membrane, ear canal and external ear normal.      Left Ear: Tympanic membrane, " ear canal and external ear normal.      Nose: No mucosal edema or rhinorrhea.      Right Turbinates: Not enlarged, swollen or pale.      Left Turbinates: Not enlarged, swollen or pale.      Mouth/Throat:      Lips: Pink.      Mouth: Mucous membranes are moist.      Pharynx: Oropharynx is clear. No pharyngeal swelling, oropharyngeal exudate or posterior oropharyngeal erythema.   Eyes:      General:         Right eye: No discharge.         Left eye: No discharge.      Conjunctiva/sclera: Conjunctivae normal.   Cardiovascular:      Rate and Rhythm: Normal rate and regular rhythm.      Heart sounds: Normal heart sounds. No murmur heard.  Pulmonary:      Effort: Pulmonary effort is normal. No respiratory distress.      Breath sounds: Normal breath sounds and air entry. No stridor, decreased air movement or transmitted upper airway sounds. No decreased breath sounds, wheezing, rhonchi or rales.   Musculoskeletal:         General: Normal range of motion.   Skin:     General: Skin is warm.   Neurological:      Mental Status: He is alert and oriented to person, place, and time.   Psychiatric:         Mood and Affect: Mood normal.         Behavior: Behavior normal.         ASSESSMENT/PLAN:    Seasonal allergic rhinitis due to pollen  Acute recurrent maxillary sinusitis  Allergic conjunctivitis of both eyes    Considering that the previous SPT was not very informative, I ordered serum IgE for the regional aeroallergen panel.  - Intranasal fluticasone has not been very effective.  I recommend stopping it.  - Start budesonide/saline irrigations twice daily.  - Add azelastine 2 sprays in each nostril twice daily as needed.  - Use Optivar 1 drop in each eye twice daily as needed.  Depending on the results, consider allergen immunotherapy.    - IgE  - Allergen cat epithellium IgE  - Allergen dog epithelium IgE  - Allergen Hardeep grass IgE  - Allergen orchard grass IgE  - Allergen safia IgE  - Allergen D farinae IgE  - Allergen D  pteronyssinus IgE  - Allergen alternaria alternata IgE  - Allergen aspergillus fumigatus IgE  - Allergen cladosporium herbarum IgE  - Allergen Epicoccum purpurascens IgE  - Allergen penicillium notatum IgE  - Allergen olinda white IgE  - Allergen Cedar IgE  - Allergen cottonwood IgE  - Allergen elm IgE  - Allergen maple box elder IgE  - Allergen oak white IgE  - Allergen Red [place] IgE  - Allergen silver  birch IgE  - Allergen Tree White [place] IgE  - Allergen [place] Tree  - Allergen white pine IgE  - Allergen English plantain IgE  - Allergen giant ragweed IgE  - Allergen lamb's quarter IgE  - Allergen Mugwort IgE  - Allergen ragweed short IgE  - Allergen Sagebrush [place] IgE  - Allergen Sheep Sorrel IgE  - Allergen thistle Russian IgE  - Allergen Weed Nettle IgE  - Allergen, Kochia/Firebush  - CBC with Platelets & Differential  - azelastine (ASTELIN) 0.1 % nasal spray  Dispense: 30 mL; Refill: 3  - budesonide (PULMICORT) 0.5 MG/2ML neb solution  Dispense: 120 mL; Refill: 2  - azelastine (OPTIVAR) 0.05 % ophthalmic solution  Dispense: 6 mL; Refill: 3       Return in about 6 weeks (around 8/1/2023), or if symptoms worsen or fail to improve.    Thank you for allowing us to participate in the care of this patient. Please feel free to contact us if there are any questions or concerns about the patient.    Disclaimer: This note consists of symbols derived from keyboarding, dictation and/or voice recognition software. As a result, there may be errors in the script that have gone undetected. Please consider this when interpreting information found in this chart.    Guillermo Leos MD, FAAAAI, FACAAI  Allergy, Asthma and Immunology     [facility]

## 2023-06-21 LAB
A ALTERNATA IGE QN: <0.1 KU(A)/L
A FUMIGATUS IGE QN: <0.1 KU(A)/L
C HERBARUM IGE QN: <0.1 KU(A)/L
CALIF WALNUT POLN IGE QN: <0.1 KU(A)/L
CAT DANDER IGG QN: <0.1 KU(A)/L
CEDAR IGE QN: <0.1 KU(A)/L
COCKSFOOT IGE QN: <0.1 KU(A)/L
COMMON RAGWEED IGE QN: <0.1 KU(A)/L
COTTONWOOD IGE QN: <0.1 KU(A)/L
D FARINAE IGE QN: <0.1 KU(A)/L
D PTERONYSS IGE QN: <0.1 KU(A)/L
DOG DANDER+EPITH IGE QN: <0.1 KU(A)/L
E PURPURASCENS IGE QN: <0.1 KU(A)/L
EAST WHITE PINE IGE QN: <0.1 KU(A)/L
ENGL PLANTAIN IGE QN: <0.1 KU(A)/L
FIREBUSH IGE QN: <0.1 KU(A)/L
GIANT RAGWEED IGE QN: <0.1 KU(A)/L
GOOSEFOOT IGE QN: <0.1 KU(A)/L
IGE SERPL-ACNC: 29 KU/L (ref 0–114)
JOHNSON GRASS IGE QN: <0.1 KU(A)/L
MAPLE IGE QN: 0.24 KU(A)/L
MUGWORT IGE QN: <0.1 KU(A)/L
NETTLE IGE QN: <0.1 KU(A)/L
P NOTATUM IGE QN: <0.1 KU(A)/L
RED MULBERRY IGE QN: <0.1 KU(A)/L
SALTWORT IGE QN: <0.1 KU(A)/L
SHEEP SORREL IGE QN: <0.1 KU(A)/L
SILVER BIRCH IGE QN: <0.1 KU(A)/L
TIMOTHY IGE QN: <0.1 KU(A)/L
WHITE ASH IGE QN: <0.1 KU(A)/L
WHITE ELM IGE QN: <0.1 KU(A)/L
WHITE MULBERRY IGE QN: <0.1 KU(A)/L
WHITE OAK IGE QN: <0.1 KU(A)/L
WORMWOOD IGE QN: <0.1 KU(A)/L

## 2023-06-26 NOTE — RESULT ENCOUNTER NOTE
[employer] message sent:   CBC with differential is within normal limits.  Absolute eosinophil count 200.  Total serum IgE is within normal limits.  Serum IgE for regional aeroallergen panel with slight sensitivity to pollen of maple tree.  The rest was negative.  Improvement compared to December 2020 noted.  Slight sensitivity to maple tree pollen would not explain all year-round symptoms.    - I would recommend no changes in the previously discussed medication treatment plan.  I do not think that allergy shots with maple tree pollen will be very helpful.

## 2023-07-18 NOTE — PROGRESS NOTES
History of Present Illness - Guillermo Terrell is a 27 year old male presenting in clinic today for a recheck on Patient presents with:  Follow Up: sinus    Patient with a history of septoplasty and turbinate reduction presents now for evaluation of some recurrence of sinusitis.  His CT scan did not show any significant pathology and he had allergy evaluation only turned out to be positive to maple.  Previously he had more positivity couple years ago.    Present Symptoms include: SOME NASAL CONGESTION BUT OTHERWISE NO STIGMATA OF SINUSITIS      BP Readings from Last 1 Encounters:   07/24/23 134/78       BP noted to be well controlled today in office.     Guillermo IS a smoker/uses chewing tobacco.  Guillermo is not ready to quit      Past Medical History -   Past Medical History:   Diagnosis Date    Lyme disease 2000       Current Medications -   Current Outpatient Medications:     Acetaminophen (TYLENOL PO), , Disp: , Rfl:     azelastine (ASTELIN) 0.1 % nasal spray, Spray 2 sprays into both nostrils 2 times daily as needed for rhinitis (Patient not taking: Reported on 7/24/2023), Disp: 30 mL, Rfl: 3    azelastine (OPTIVAR) 0.05 % ophthalmic solution, Apply 1 drop to eye 2 times daily as needed (itchy/watery eyes) (Patient not taking: Reported on 7/24/2023), Disp: 6 mL, Rfl: 3    budesonide (PULMICORT) 0.5 MG/2ML neb solution, Mix respule of 0.5mg/2 mL budesonide vial in 8oz normal saline sinus rinse bottle. Irrigate each nostril with half of the bottle twice daily (Patient not taking: Reported on 7/24/2023), Disp: 120 mL, Rfl: 2    fluticasone (FLONASE) 50 MCG/ACT nasal spray, Spray 1 spray into both nostrils daily (Patient not taking: Reported on 7/24/2023), Disp: , Rfl:     Pseudoephedrine-guaiFENesin (MUCINEX D PO), , Disp: , Rfl:     Allergies -   Allergies   Allergen Reactions    Lorabid [Cephalosporins] Rash     When he was around [age] year old.       Social History -   Social History     Socioeconomic History     "Marital status: Single   Occupational History    Occupation: heating and air   Tobacco Use    Smoking status: Former     Types: Cigarettes    Smokeless tobacco: Current     Types: Chew    Tobacco comments:     sometimes   Vaping Use    Vaping Use: Former   Substance and Sexual Activity    Alcohol use: Yes    Drug use: Not Currently     Types: Marijuana     Comment: not for over a year 11/24/2020    Sexual activity: Yes   Social History Narrative    6/20/23    ENVIRONMENTAL HISTORY: [place] The home is heated with a forced air. They do have central air conditioning. The patient's bedroom is furnished with feather/wool bedding or pillows and carpeting in bedroom.  Pets inside the house include 0 pets. There is no history of cockroach or mice infestation. There is/are 0 smokers in the house.  The house does not have a damp basement.        Family History -   Family History   Problem Relation Age of Onset    Diabetes Mother         Type 1    Breast Cancer Maternal Grandmother     Circulatory Paternal Grandmother         Annuersym    Diabetes Other         paternal cousin Type 1    Asthma No family hx of        Review of Systems - As per HPI and PMHx, otherwise review of system review of the head and neck negative. Otherwise 10+ review of system is negative    Physical Exam  /78 (BP Location: Right arm, Patient Position: Sitting, Cuff Size: Adult Regular)   Temp 98  F (36.7  C) (Temporal)   Ht 1.93 m (6' 4\")   Wt 135.3 kg (298 lb 6 oz)   BMI 36.32 kg/m    BMI: Body mass index is 36.32 kg/m .    General - The patient is well nourished and well developed, and appears to have good nutritional status.  Alert and oriented to person and place, answers questions and cooperates with examination appropriately.    SKIN - No suspicious lesions or rashes.  Respiration - No respiratory distress.  Head and Face - Normocephalic and atraumatic, with no gross asymmetry noted of the " contour of the facial features.  The facial nerve is intact, with strong symmetric movements.    Voice and Breathing - The patient was breathing comfortably without the use of accessory muscles. The patients voice was clear and strong, and had appropriate pitch and quality.    Ears - Bilateral pinna and EACs with normal appearing overlying skin. Tympanic membrane intact with good mobility on pneumatic otoscopy bilaterally. Bony landmarks of the ossicular chain are normal. The tympanic membranes are normal in appearance. No retraction, perforation, or masses.  No fluid or purulence was seen in the external canal or the middle ear.     Eyes - Extraocular movements intact.  Sclera were not icteric or injected, conjunctiva were pink and moist.    Mouth - Examination of the oral cavity showed pink, healthy oral mucosa. No lesions or ulcerations noted.  The tongue was mobile and midline, and the dentition were in good condition.      Throat - The walls of the oropharynx were smooth, pink, moist, symmetric, and had no lesions or ulcerations.  The tonsillar pillars and soft palate were symmetric. Tonsils are symmetric. The uvula was midline on elevation.    Neck - Normal midline excursion of the laryngotracheal complex during swallowing.  Full range of motion on passive movement.  Palpation of the occipital, submental, submandibular, internal jugular chain, and supraclavicular nodes did not demonstrate any abnormal lymph nodes or masses.  The carotid pulse was palpable bilaterally.  Palpation of the thyroid was soft and smooth, with no nodules or goiter appreciated.  The trachea was mobile and midline.    Nose - External contour is symmetric, no gross deflection or scars.  Nasal mucosa is pink and moist with no abnormal mucus.  The septum was midline and non-obstructive, turbinates of normal size and position.  No polyps, masses, or purulence noted on examination.    Neuro - Nonfocal neuro exam is normal, CN 2 through 12  intact, normal gait and muscle tone.      Performed in clinic today:  No procedures preformed in clinic today      A/P - Guillermo Terrell is a 27 year old male Patient presents with:  Follow Up: sinus    Patient with some recurrence of sinusitis but no current disease.  At this point we discussed prophylactic use of saline and nasal steroids sprays.    No significant discrete allergies to treat right now.  Patient will continue with more conservative treatment and reevaluate in a few months.  If recurrence of sinus infections continue he may be candidate for conservative functional endoscopic sinus surgery.    Guillermo should follow up in 3 months.          Timur Triana MD

## 2023-07-24 ENCOUNTER — OFFICE VISIT (OUTPATIENT)
Dept: OTOLARYNGOLOGY | Facility: CLINIC | Age: 28
End: 2023-07-24
Payer: COMMERCIAL

## 2023-07-24 VITALS
BODY MASS INDEX: 36.33 KG/M2 | SYSTOLIC BLOOD PRESSURE: 134 MMHG | WEIGHT: 298.38 LBS | TEMPERATURE: 98 F | HEIGHT: 76 IN | DIASTOLIC BLOOD PRESSURE: 78 MMHG

## 2023-07-24 DIAGNOSIS — J01.01 ACUTE RECURRENT MAXILLARY SINUSITIS: Primary | ICD-10-CM

## 2023-07-24 PROCEDURE — 99213 OFFICE O/P EST LOW 20 MIN: CPT | Performed by: OTOLARYNGOLOGY

## 2023-07-24 ASSESSMENT — PAIN SCALES - GENERAL: PAINLEVEL: NO PAIN (0)

## 2023-07-24 NOTE — LETTER
7/24/2023         RE: Guillermo Terrell  [id number] [facility] [address]        Dear Colleague,    Thank you for referring your patient, Guillermo Terrell, to the [facility]. Please see a copy of my visit note below.    History of Present Illness - Guillermo Terrell is a 27 year old male presenting in clinic today for a recheck on Patient presents with:  Follow Up: sinus    Patient with a history of septoplasty and turbinate reduction presents now for evaluation of some recurrence of sinusitis.  His CT scan did not show any significant pathology and he had allergy evaluation only turned out to be positive to maple.  Previously he had more positivity couple years ago.    Present Symptoms include: SOME NASAL CONGESTION BUT OTHERWISE NO STIGMATA OF SINUSITIS      BP Readings from Last 1 Encounters:   07/24/23 134/78       BP noted to be well controlled today in office.     Guillermo IS a smoker/uses chewing tobacco.  Guillermo is not ready to quit      Past Medical History -   Past Medical History:   Diagnosis Date     Lyme disease 2000       Current Medications -   Current Outpatient Medications:      Acetaminophen (TYLENOL PO), , Disp: , Rfl:      azelastine (ASTELIN) 0.1 % nasal spray, Spray 2 sprays into both nostrils 2 times daily as needed for rhinitis (Patient not taking: Reported on 7/24/2023), Disp: 30 mL, Rfl: 3     azelastine (OPTIVAR) 0.05 % ophthalmic solution, Apply 1 drop to eye 2 times daily as needed (itchy/watery eyes) (Patient not taking: Reported on 7/24/2023), Disp: 6 mL, Rfl: 3     budesonide (PULMICORT) 0.5 MG/2ML neb solution, Mix respule of 0.5mg/2 mL budesonide vial in 8oz normal saline sinus rinse bottle. Irrigate each nostril with half of the bottle twice daily (Patient not taking: Reported on 7/24/2023), Disp: 120 mL, Rfl: 2     fluticasone (FLONASE) 50 MCG/ACT nasal spray, Spray 1 spray into both nostrils daily (Patient not taking: Reported on 7/24/2023), Disp: , Rfl:      " Pseudoephedrine-guaiFENesin (MUCINEX D PO), , Disp: , Rfl:     Allergies -   Allergies   Allergen Reactions     Lorabid [Cephalosporins] Rash     When he was around [age] year old.       Social History -   Social History     Socioeconomic History     Marital status: Single   Occupational History     Occupation: heating and air   Tobacco Use     Smoking status: Former     Types: Cigarettes     Smokeless tobacco: Current     Types: Chew     Tobacco comments:     sometimes   Vaping Use     Vaping Use: Former   Substance and Sexual Activity     Alcohol use: Yes     Drug use: Not Currently     Types: Marijuana     Comment: not for over a year 11/24/2020     Sexual activity: Yes   Social History Narrative    6/20/23    ENVIRONMENTAL HISTORY: [place] The home is heated with a forced air. They do have central air conditioning. The patient's bedroom is furnished with feather/wool bedding or pillows and carpeting in bedroom.  Pets inside the house include 0 pets. There is no history of cockroach or mice infestation. There is/are 0 smokers in the house.  The house does not have a damp basement.        Family History -   Family History   Problem Relation Age of Onset     Diabetes Mother         Type 1     Breast Cancer Maternal Grandmother      Circulatory Paternal Grandmother         Annuersym     Diabetes Other         paternal cousin Type 1     Asthma No family hx of        Review of Systems - As per HPI and PMHx, otherwise review of system review of the head and neck negative. Otherwise 10+ review of system is negative    Physical Exam  /78 (BP Location: Right arm, Patient Position: Sitting, Cuff Size: Adult Regular)   Temp 98  F (36.7  C) (Temporal)   Ht 1.93 m (6' 4\")   Wt 135.3 kg (298 lb 6 oz)   BMI 36.32 kg/m    BMI: Body mass index is 36.32 kg/m .    General - The patient is well nourished and well developed, and appears to have good nutritional status.  Alert and " oriented to person and place, answers questions and cooperates with examination appropriately.    SKIN - No suspicious lesions or rashes.  Respiration - No respiratory distress.  Head and Face - Normocephalic and atraumatic, with no gross asymmetry noted of the contour of the facial features.  The facial nerve is intact, with strong symmetric movements.    Voice and Breathing - The patient was breathing comfortably without the use of accessory muscles. The patients voice was clear and strong, and had appropriate pitch and quality.    Ears - Bilateral pinna and EACs with normal appearing overlying skin. Tympanic membrane intact with good mobility on pneumatic otoscopy bilaterally. Bony landmarks of the ossicular chain are normal. The tympanic membranes are normal in appearance. No retraction, perforation, or masses.  No fluid or purulence was seen in the external canal or the middle ear.     Eyes - Extraocular movements intact.  Sclera were not icteric or injected, conjunctiva were pink and moist.    Mouth - Examination of the oral cavity showed pink, healthy oral mucosa. No lesions or ulcerations noted.  The tongue was mobile and midline, and the dentition were in good condition.      Throat - The walls of the oropharynx were smooth, pink, moist, symmetric, and had no lesions or ulcerations.  The tonsillar pillars and soft palate were symmetric. Tonsils are symmetric. The uvula was midline on elevation.    Neck - Normal midline excursion of the laryngotracheal complex during swallowing.  Full range of motion on passive movement.  Palpation of the occipital, submental, submandibular, internal jugular chain, and supraclavicular nodes did not demonstrate any abnormal lymph nodes or masses.  The carotid pulse was palpable bilaterally.  Palpation of the thyroid was soft and smooth, with no nodules or goiter appreciated.  The trachea was mobile and midline.    Nose - External contour is symmetric, no gross deflection or  scars.  Nasal mucosa is pink and moist with no abnormal mucus.  The septum was midline and non-obstructive, turbinates of normal size and position.  No polyps, masses, or purulence noted on examination.    Neuro - Nonfocal neuro exam is normal, CN 2 through 12 intact, normal gait and muscle tone.      Performed in clinic today:  No procedures preformed in clinic today      A/P - Guillermo Terrell is a 27 year old male Patient presents with:  Follow Up: sinus    Patient with some recurrence of sinusitis but no current disease.  At this point we discussed prophylactic use of saline and nasal steroids sprays.    No significant discrete allergies to treat right now.  Patient will continue with more conservative treatment and reevaluate in a few months.  If recurrence of sinus infections continue he may be candidate for conservative functional endoscopic sinus surgery.    Guillermo should follow up in 3 months.          Timur Triana MD        Again, thank you for allowing me to participate in the care of your patient.        Sincerely,        Timur Triana MD, MD

## 2024-05-25 ENCOUNTER — HEALTH MAINTENANCE LETTER (OUTPATIENT)
Age: 29
End: 2024-05-25

## 2024-09-12 ENCOUNTER — OFFICE VISIT (OUTPATIENT)
Dept: FAMILY MEDICINE | Facility: OTHER | Age: 29
End: 2024-09-12
Payer: COMMERCIAL

## 2024-09-12 VITALS
HEART RATE: 50 BPM | BODY MASS INDEX: 38.3 KG/M2 | OXYGEN SATURATION: 100 % | WEIGHT: 308 LBS | RESPIRATION RATE: 20 BRPM | TEMPERATURE: 98.1 F | HEIGHT: 75 IN | SYSTOLIC BLOOD PRESSURE: 144 MMHG | DIASTOLIC BLOOD PRESSURE: 88 MMHG

## 2024-09-12 DIAGNOSIS — Z11.4 SCREENING FOR HIV (HUMAN IMMUNODEFICIENCY VIRUS): ICD-10-CM

## 2024-09-12 DIAGNOSIS — Z11.59 NEED FOR HEPATITIS C SCREENING TEST: ICD-10-CM

## 2024-09-12 DIAGNOSIS — M79.10 MUSCLE ACHE: ICD-10-CM

## 2024-09-12 DIAGNOSIS — I10 HTN, GOAL BELOW 140/80: ICD-10-CM

## 2024-09-12 DIAGNOSIS — Z00.00 ROUTINE HISTORY AND PHYSICAL EXAMINATION OF ADULT: Primary | ICD-10-CM

## 2024-09-12 DIAGNOSIS — M25.50 MULTIPLE JOINT PAIN: ICD-10-CM

## 2024-09-12 DIAGNOSIS — E66.01 CLASS 2 SEVERE OBESITY DUE TO EXCESS CALORIES WITH SERIOUS COMORBIDITY AND BODY MASS INDEX (BMI) OF 38.0 TO 38.9 IN ADULT (H): ICD-10-CM

## 2024-09-12 DIAGNOSIS — R06.83 SNORING: ICD-10-CM

## 2024-09-12 DIAGNOSIS — L72.3 SEBACEOUS CYST: ICD-10-CM

## 2024-09-12 DIAGNOSIS — E66.812 CLASS 2 SEVERE OBESITY DUE TO EXCESS CALORIES WITH SERIOUS COMORBIDITY AND BODY MASS INDEX (BMI) OF 38.0 TO 38.9 IN ADULT (H): ICD-10-CM

## 2024-09-12 DIAGNOSIS — F90.0 ATTENTION DEFICIT HYPERACTIVITY DISORDER (ADHD), PREDOMINANTLY INATTENTIVE TYPE: ICD-10-CM

## 2024-09-12 DIAGNOSIS — J30.1 SEASONAL ALLERGIC RHINITIS DUE TO POLLEN: ICD-10-CM

## 2024-09-12 PROBLEM — R03.0 ELEVATED BLOOD PRESSURE READING WITHOUT DIAGNOSIS OF HYPERTENSION: Status: RESOLVED | Noted: 2019-12-16 | Resolved: 2024-09-12

## 2024-09-12 LAB
ALBUMIN SERPL BCG-MCNC: 4.6 G/DL (ref 3.5–5.2)
ALP SERPL-CCNC: 65 U/L (ref 40–150)
ALT SERPL W P-5'-P-CCNC: 40 U/L (ref 0–70)
ANION GAP SERPL CALCULATED.3IONS-SCNC: 14 MMOL/L (ref 7–15)
AST SERPL W P-5'-P-CCNC: 27 U/L (ref 0–45)
BILIRUB SERPL-MCNC: 0.3 MG/DL
BUN SERPL-MCNC: 14.7 MG/DL (ref 6–20)
CALCIUM SERPL-MCNC: 9.6 MG/DL (ref 8.8–10.4)
CHLORIDE SERPL-SCNC: 104 MMOL/L (ref 98–107)
CHOLEST SERPL-MCNC: 179 MG/DL
CREAT SERPL-MCNC: 0.91 MG/DL (ref 0.67–1.17)
EGFRCR SERPLBLD CKD-EPI 2021: >90 ML/MIN/1.73M2
ERYTHROCYTE [DISTWIDTH] IN BLOOD BY AUTOMATED COUNT: 12.4 % (ref 10–15)
FASTING STATUS PATIENT QL REPORTED: NO
FASTING STATUS PATIENT QL REPORTED: NO
GLUCOSE SERPL-MCNC: 85 MG/DL (ref 70–99)
HCO3 SERPL-SCNC: 21 MMOL/L (ref 22–29)
HCT VFR BLD AUTO: 44 % (ref 40–53)
HDLC SERPL-MCNC: 55 MG/DL
HGB BLD-MCNC: 14.7 G/DL (ref 13.3–17.7)
LDLC SERPL CALC-MCNC: 92 MG/DL
MCH RBC QN AUTO: 29.6 PG (ref 26.5–33)
MCHC RBC AUTO-ENTMCNC: 33.4 G/DL (ref 31.5–36.5)
MCV RBC AUTO: 89 FL (ref 78–100)
NONHDLC SERPL-MCNC: 124 MG/DL
PLATELET # BLD AUTO: 251 10E3/UL (ref 150–450)
POTASSIUM SERPL-SCNC: 3.8 MMOL/L (ref 3.4–5.3)
PROT SERPL-MCNC: 7.5 G/DL (ref 6.4–8.3)
RBC # BLD AUTO: 4.97 10E6/UL (ref 4.4–5.9)
SODIUM SERPL-SCNC: 139 MMOL/L (ref 135–145)
TRIGL SERPL-MCNC: 159 MG/DL
WBC # BLD AUTO: 8.1 10E3/UL (ref 4–11)

## 2024-09-12 PROCEDURE — 90471 IMMUNIZATION ADMIN: CPT | Performed by: PHYSICIAN ASSISTANT

## 2024-09-12 PROCEDURE — 85027 COMPLETE CBC AUTOMATED: CPT | Performed by: PHYSICIAN ASSISTANT

## 2024-09-12 PROCEDURE — 86038 ANTINUCLEAR ANTIBODIES: CPT | Performed by: PHYSICIAN ASSISTANT

## 2024-09-12 PROCEDURE — 36415 COLL VENOUS BLD VENIPUNCTURE: CPT | Performed by: PHYSICIAN ASSISTANT

## 2024-09-12 PROCEDURE — 99214 OFFICE O/P EST MOD 30 MIN: CPT | Mod: 25 | Performed by: PHYSICIAN ASSISTANT

## 2024-09-12 PROCEDURE — 86431 RHEUMATOID FACTOR QUANT: CPT | Performed by: PHYSICIAN ASSISTANT

## 2024-09-12 PROCEDURE — 80053 COMPREHEN METABOLIC PANEL: CPT | Performed by: PHYSICIAN ASSISTANT

## 2024-09-12 PROCEDURE — 90715 TDAP VACCINE 7 YRS/> IM: CPT | Performed by: PHYSICIAN ASSISTANT

## 2024-09-12 PROCEDURE — 99395 PREV VISIT EST AGE 18-39: CPT | Mod: 25 | Performed by: PHYSICIAN ASSISTANT

## 2024-09-12 PROCEDURE — 80061 LIPID PANEL: CPT | Performed by: PHYSICIAN ASSISTANT

## 2024-09-12 RX ORDER — LOSARTAN POTASSIUM 25 MG/1
25 TABLET ORAL DAILY
Qty: 90 TABLET | Refills: 3 | Status: SHIPPED | OUTPATIENT
Start: 2024-09-12

## 2024-09-12 ASSESSMENT — PAIN SCALES - GENERAL: PAINLEVEL: NO PAIN (0)

## 2024-09-12 NOTE — PROGRESS NOTES
Prior to immunization administration, verified patients identity using patient s name and date of birth. Please see Immunization Activity for additional information.     Screening Questionnaire for Adult Immunization    Are you sick today?   No   Do you have allergies to medications, food, a vaccine component or latex?   No   Have you ever had a serious reaction after receiving a vaccination?   No   Do you have a long-term health problem with heart, lung, kidney, or metabolic disease (e.g., diabetes), asthma, a blood disorder, no spleen, complement component deficiency, a cochlear implant, or a spinal fluid leak?  Are you on long-term aspirin therapy?   No   Do you have cancer, leukemia, HIV/AIDS, or any other immune system problem?   No   Do you have a parent, brother, or sister with an immune system problem?   No   In the past 3 months, have you taken medications that affect  your immune system, such as prednisone, other steroids, or anticancer drugs; drugs for the treatment of rheumatoid arthritis, Crohn s disease, or psoriasis; or have you had radiation treatments?   No   Have you had a seizure, or a brain or other nervous system problem?   No   During the past year, have you received a transfusion of blood or blood    products, or been given immune (gamma) globulin or antiviral drug?   No   For women: Are you pregnant or is there a chance you could become       pregnant during the next month?   No   Have you received any vaccinations in the past 4 weeks?   No     Immunization questionnaire answers were all negative.      Patient instructed to remain in clinic for 15 minutes afterwards, and to report any adverse reactions.     Screening performed by Karrie Dos Santos MA on 9/12/2024 at 3:27 PM.
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
## 2024-09-13 LAB — RHEUMATOID FACT SERPL-ACNC: <10 IU/ML

## 2024-09-16 LAB — ANA SER QL IF: NEGATIVE

## 2024-10-24 ENCOUNTER — OFFICE VISIT (OUTPATIENT)
Dept: FAMILY MEDICINE | Facility: OTHER | Age: 29
End: 2024-10-24
Payer: COMMERCIAL

## 2024-10-24 VITALS
TEMPERATURE: 98 F | WEIGHT: 302.5 LBS | SYSTOLIC BLOOD PRESSURE: 122 MMHG | DIASTOLIC BLOOD PRESSURE: 64 MMHG | OXYGEN SATURATION: 97 % | RESPIRATION RATE: 16 BRPM | BODY MASS INDEX: 37.61 KG/M2 | HEIGHT: 75 IN | HEART RATE: 47 BPM

## 2024-10-24 DIAGNOSIS — K90.49 FOOD INTOLERANCE IN ADULT: ICD-10-CM

## 2024-10-24 DIAGNOSIS — Z11.59 NEED FOR HEPATITIS C SCREENING TEST: ICD-10-CM

## 2024-10-24 DIAGNOSIS — E66.812 CLASS 2 DRUG-INDUCED OBESITY WITHOUT SERIOUS COMORBIDITY WITH BODY MASS INDEX (BMI) OF 37.0 TO 37.9 IN ADULT: ICD-10-CM

## 2024-10-24 DIAGNOSIS — Z11.4 SCREENING FOR HIV (HUMAN IMMUNODEFICIENCY VIRUS): ICD-10-CM

## 2024-10-24 DIAGNOSIS — E66.1 CLASS 2 DRUG-INDUCED OBESITY WITHOUT SERIOUS COMORBIDITY WITH BODY MASS INDEX (BMI) OF 37.0 TO 37.9 IN ADULT: ICD-10-CM

## 2024-10-24 DIAGNOSIS — I10 HTN, GOAL BELOW 140/80: Primary | ICD-10-CM

## 2024-10-24 LAB — TSH SERPL DL<=0.005 MIU/L-ACNC: 1.4 UIU/ML (ref 0.3–4.2)

## 2024-10-24 PROCEDURE — G2211 COMPLEX E/M VISIT ADD ON: HCPCS | Performed by: PHYSICIAN ASSISTANT

## 2024-10-24 PROCEDURE — 99214 OFFICE O/P EST MOD 30 MIN: CPT | Performed by: PHYSICIAN ASSISTANT

## 2024-10-24 PROCEDURE — 36415 COLL VENOUS BLD VENIPUNCTURE: CPT | Performed by: PHYSICIAN ASSISTANT

## 2024-10-24 PROCEDURE — 84443 ASSAY THYROID STIM HORMONE: CPT | Performed by: PHYSICIAN ASSISTANT

## 2024-10-24 NOTE — PROGRESS NOTES
Assessment & Plan   The longitudinal plan of care for the diagnosis(es)/condition(s) as documented were addressed during this visit. Due to the added complexity in care, I will continue to support Guillermo in the subsequent management and with ongoing continuity of care.    HTN, goal below 140/80  Blood pressure is in very good control at this point in time with no new concerns.  Refilled medications as needed.  Follow-up in 6 months for blood pressure recheck is recommended.  - TSH with free T4 reflex; Future  - TSH with free T4 reflex    Class 2 drug-induced obesity without serious comorbidity with body mass index (BMI) of 37.0 to 37.9 in adult  Still struggling with weight loss.  Right now he feels like he is in a more of a yoyo type of weight loss.  Recommended very careful portion controls and seeing adult nutrition.  Advised that he needs to make sure that he mentions the struggles that he has with vegetables as noted below for the dietitian to review and help him make a plan for sustainable weight loss for the long-term.  - TSH with free T4 reflex; Future  - Adult Nutrition  Referral; Future  - TSH with free T4 reflex    Food intolerance in adult - near diarrhea with vegetables  Curious finding.  Do wonder about metabolic deficiency related to the ability of his body to breakdown vegetables.  Will have him see GI for further workup.  - Adult GI  Referral - Consult Only; Future    Screening for HIV (human immunodeficiency virus)  Need for hepatitis C screening test  Declined screening considering himself low risk.    CONSULTATION/REFERRAL to consider referral to weight loss clinic in the future.  Would have him see nutritional specialist.  Work on weight loss  Regular exercise    Subjective   Guillermo is a 28 year old, presenting for the following health issues:  Hypertension      10/24/2024     3:14 PM   Additional Questions   Roomed by Milagros ALCANTAR   Accompanied by self     History of Present  "Illness       Hypertension: He presents for follow up of hypertension.  He does check blood pressure  regularly outside of the clinic. Outside blood pressures have been over 140/90. He does not follow a low salt diet.     He eats 0-1 servings of fruits and vegetables daily.He consumes 1 sweetened beverage(s) daily.He exercises with enough effort to increase his heart rate 30 to 60 minutes per day.  He exercises with enough effort to increase his heart rate 6 days per week.   He is taking medications regularly.           Review of Systems  Constitutional, HEENT, cardiovascular, pulmonary, GI, , musculoskeletal, neuro, skin, endocrine and psych systems are negative, except as otherwise noted.      Objective    /64   Pulse (!) 47   Temp 98  F (36.7  C) (Temporal)   Resp 16   Ht 1.911 m (6' 3.24\")   Wt 137.2 kg (302 lb 8 oz)   SpO2 97%   BMI 37.57 kg/m    Body mass index is 37.57 kg/m .  Physical Exam   GENERAL: alert and no distress  NECK: no adenopathy, no asymmetry, masses, or scars  RESP: lungs clear to auscultation - no rales, rhonchi or wheezes  CV: regular rate and rhythm, normal S1 S2, no S3 or S4, no murmur, click or rub, no peripheral edema  ABDOMEN: soft, nontender, no hepatosplenomegaly, no masses and bowel sounds normal  MS: no gross musculoskeletal defects noted, no edema          Signed Electronically by: Roger Purcell PA-C    "

## 2025-01-05 NOTE — PROGRESS NOTES
"  [address] [address]  Phone: [phone]    Patient:  Guillermo Terrell, Date of birth 1995    Referring Provider Roger Solo    Gastroenterology CLINIC VISIT, NEW PATIENT    REASON FOR CONSULTATION: food intolerance    HPI: 29 year old male was referred to GI clinic for a consultation of \"loose stools when consuming veggies\". Patient stated that approximately 8 or 10 years ago, he started noticing that every time he consumes fresh veggies, eggs, or salads, in about an hour, he would have several loose stools  Some days, he may have up to 10 stools a day. Stated that abdominal cramping usually precedes defecation and resolves after having bowel movement. Said that he is on regular diet. Interestingly, cooked veggies do not cause any symptoms. Patient describes his bowel movements as [place] 3 and 4, except when he has bouts of diarrhea. No hematochezia or melena. No nausea or vomiting. No dysphagia or odynophagia. Patient stated that he is working on weight loss by reducing his portion size.      PREVIOUS ENDOSCOPY:  None    PERTINENT STUDIES Reviewed in EMR      ROS: 10pt ROS performed and otherwise negative.    PAST MEDICAL HISTORY:  Past Medical History:   Diagnosis Date    Elevated blood pressure reading without diagnosis of hypertension 12/16/2019    Lyme disease 2000       PREVIOUS ABDOMINAL/GYNECOLOGIC SURGERIES:  Past Surgical History:   Procedure Laterality Date    ENDOSCOPIC BALLOON SINUPLASTY ACCLARENT Bilateral 5/11/2021    Procedure: ENDOSCOPIC BALLOON SINUPLASTY ACCLARENT;  Surgeon: Timur Triana MD;  Location: [facility]    SEPTOPLASTY, TURBINOPLASTY, COMBINED Bilateral 5/11/2021    Procedure: SEPTOPLASTY, NOSE, WITH submucous resection of turbinates;  Surgeon: Timur Triana MD;  Location: [facility] OR    TONSILLECTOMY & ADENOIDECTOMY  12/06         PERTINENT MEDICATIONS:  Current Outpatient Medications   Medication Sig Dispense Refill    " fluticasone (FLONASE) 50 MCG/ACT nasal spray Spray 1 spray into both nostrils daily      losartan (COZAAR) 25 MG tablet Take 1 tablet (25 mg) by mouth daily. 90 tablet 3    methylcellulose (CITRUCEL) 500 MG TABS tablet Take 2 tablets (1,000 mg) by mouth 2 times daily. 120 tablet 5         SOCIAL HISTORY:  Social History     Socioeconomic History    Marital status: Single     Spouse name: Not on file    Number of children: Not on file    Years of education: Not on file    Highest education level: Not on file   Occupational History    Occupation: heating and air   Tobacco Use    Smoking status: Former     Types: Cigarettes    Smokeless tobacco: Former     Types: Chew     Quit date: 07/2024    Tobacco comments:     sometimes   Vaping Use    Vaping status: Some Days   Substance and Sexual Activity    Alcohol use: Yes    Drug use: Not Currently     Types: Marijuana     Comment: not for over a year 11/24/2020    Sexual activity: Yes   Other Topics Concern     Service Not Asked    Blood Transfusions Not Asked    Caffeine Concern Not Asked    Occupational Exposure Not Asked    Hobby Hazards Not Asked    Sleep Concern Not Asked    Stress Concern Not Asked    Weight Concern Not Asked    Special Diet Not Asked    Back Care Not Asked    Exercise Not Asked    Bike Helmet Not Asked    Seat Belt Not Asked    Self-Exams Not Asked    Parent/sibling w/ CABG, MI or angioplasty before 65F 55M? Not Asked   Social History Narrative    6/20/23    ENVIRONMENTAL HISTORY: [place] The [place] is heated with a forced air. They do have central air conditioning. The patient's bedroom is furnished with feather/wool bedding or pillows and carpeting in bedroom.  Pets inside the house include 0 pets. There is no history of cockroach or mice infestation. There is/are 0 smokers in the house.  The house does not have a damp basement.      Social Drivers of Health     Financial Resource Strain: Low Risk   (9/12/2024)    Financial Resource Strain     Within the past 12 months, have you or your family members you live with been unable to get utilities (heat, electricity) when it was really needed?: No   Food Insecurity: Low Risk  (9/12/2024)    Food Insecurity     Within the past 12 months, did you worry that your food would run out before you got money to buy more?: No     Within the past 12 months, did the food you bought just not last and you didn t have money to get more?: No   Transportation Needs: Low Risk  (9/12/2024)    Transportation Needs     Within the past 12 months, has lack of transportation kept you from medical appointments, getting your medicines, non-medical meetings or appointments, work, or from getting things that you need?: No   Physical Activity: Unknown (9/12/2024)    Exercise Vital Sign     Days of Exercise per Week: 7 days     Minutes of Exercise per Session: Not on file   Stress: Stress Concern Present (9/12/2024)    [employer] - Occupational Stress Questionnaire     Feeling of Stress : Rather much   Social Connections: Unknown (9/12/2024)    Social Connection and Isolation Panel [NHANES]     Frequency of Communication with Friends and Family: Not on file     Frequency of Social Gatherings with Friends and Family: More than three times a week     Attends Gnosticism Services: Not on file     Active Member of Clubs or Organizations: Not on file     Attends Club or Organization Meetings: Not on file     Marital Status: Not on file   Interpersonal Safety: Low Risk  (9/12/2024)    Interpersonal Safety     Do you feel physically and emotionally safe where you currently live?: Yes     Within the past 12 months, have you been hit, slapped, kicked or otherwise physically hurt by someone?: No     Within the past 12 months, have you been humiliated or emotionally abused in other ways by your partner or ex-partner?: No   Housing Stability: Low Risk  (9/12/2024)    Housing  "Stability     Do you have housing? : Yes     Are you worried about losing your housing?: No       FAMILY HISTORY:  Denies colon/panc/esophageal/other GI CA, no other Squires or other HPS-related Ruthie. No IBD/celiac, no other AI/liver/thyroid disease.    Family History   Problem Relation Age of Onset    Diabetes Mother         Type 1    Breast Cancer Maternal Grandmother     Circulatory Paternal Grandmother         Annuersym    Diabetes Other         paternal cousin Type 1    Asthma No family hx of        PHYSICAL EXAMINATION:  Vitals reviewed  /66 (BP Location: Right arm, Patient Position: Sitting, Cuff Size: Adult Large)   Pulse 52   Ht 1.911 m (6' 3.25\")   Wt 135.2 kg (298 lb)   BMI 37.00 kg/m      General: Patient appears well in no acute distress.    Skin: No visualized rash or lesions on visualized skin  HEENT:    EOMI, no erythema, sclera icterus or discharge noted.  Mouth mucosa intact, pink, moist. Mild erythema of posterior pharynx.  No cervical or supraclavicular lymphadenopathy. Thyroid gland not enlarged.  Resp: breathing comfortably without accessory muscle usage, speaking in full sentences, no cough. Lung sounds clear  Card: Regular and rhythmic S1 and S2. No gallop or rub. No murmur.  No LE edema.  Abdomen: Active bowel sounds X 4 quadrants. Soft to palpation.  No guarding or rebound tenderness. Martins's sign negative.  MSK: Appears to have normal range of motion based on visualized movements  Neurologic: No apparent tremors, facial movements symmetric  Psych: affect normal, alert and oriented    Recent Labs:  Recent Labs   Lab Test 09/12/24  1534 06/20/23  1603   WBC 8.1 7.0   HGB 14.7 14.6   HCT 44.0 44.5    267     Recent Labs   Lab Test 09/12/24  1534   ALT 40   AST 27     TSH   Date Value Ref Range Status   10/24/2024 1.40 0.30 - 4.20 uIU/mL Final     Recent Labs   Lab Test 09/12/24  1534   CR 0.91         ASSESSMENT/PLAN:    ICD-10-CM    1. Loose stools  R19.5 Tissue " "transglutaminase siobhan IgA and IgG     Allergy adult food panel     Magnesium     CT Abdomen Pelvis w/o & w Contrast     methylcellulose (CITRUCEL) 500 MG TABS tablet     Magnesium     Allergy adult food panel     Tissue transglutaminase siobhan IgA and IgG      2. Food intolerance in adult - near diarrhea with vegetables  K90.49 Adult GI  Referral - Consult Only     Tissue transglutaminase siobhan IgA and IgG     Allergy adult food panel     Allergy adult food panel     Tissue transglutaminase siobhan IgA and IgG      3. Functional gastrointestinal disorder  K92.9 methylcellulose (CITRUCEL) 500 MG TABS tablet      4. Abdominal discomfort  R10.9 CT Abdomen Pelvis w/o & w Contrast         29 year old male  presented to GI clinic for a consultation on bouts of loose stools that occur 1-2 times a week, usually after he consumes eggs or fresh veggies. May have up to 10 watery stools a day. Otherwise, he has formed stools every day. Reported some abdominal discomfort preceding defecation and resolving after he has a bowel movement.   He denies any other GI symptoms such as heart burn, nausea, vomiting, hematochezia, melena, dysphagia, poor appetite, or weight loss. Patient mentioned that he used to drink alcohol, 10+ drinks during weekdays and \"a little more\" on the weekends. Decided to cut down to 1-2 drinks a week. Stated that his diarrhea was worse when he was drinking heavy.  Noted that the patient was seen and evaluated by Dr. Leos  for chronic sinusitis and year around allergies. Total serum IgE was within normal limits. Serum IgE for regional aeroallergen panel showed slight sensitivity to pollen of maple tree.    Discussed differential diagnosis with the patient including but not limited to functional GI tract disorder such as IBS, lactose or gluten sensitivity or intolerance, fructose malabsorption, overflow diarrhea, microscopic colitis, alcohol induced diarrhea, or least likely, intestinal infection, IBD, or " EPI. Explained to the patient that I don't believe there would be benefits of colonoscopy at this time.  Patient would like to complete laboratory tests for food allergy and screening for celiac disease.   CT scan of abdomen and pelvis was ordered.  I recommended to try a small dose of Citrucel and to taper it up.  Avoid foods that trigger symptoms.  Keep a food/symptoms diary.  Patient verbalized understanding and appreciation of care provided. Stated that all of the questions were answered to her/his satisfaction.  RTC in 2 months    Thank you for this consultation. It was a pleasure to participate in the care of this patient; please contact us with any further questions.    GEOVANY Salazar, KELSIP-C  Division of Gastroenterology  [facility], [place], [place]    This note was created with Dragon voice recognition software, and while reviewed for accuracy, inadvertent minor typographic errors may occur. Please contact the provider if you have any questions.

## 2025-01-05 NOTE — PATIENT INSTRUCTIONS
"It was a pleasure taking care of you today.  I've included a brief summary of our discussion and care plan from today's visit below.  Please review this information with your primary care provider.  ______________________________________________________________________    My recommendations are summarized as follows:    Start Citrucel fiber supplement with 2 tablets at supper X one week. Then, you can increase the dose to twice a day. This should help your bouts of loose stools.    2. Keep a food diary to identify triggers of your diarrhea.    3. CT scan of abdomen was ordered for further evaluation. Lab work ordered.    4. I suspect that your symptoms could be related to irritable bowel syndrome. Below, I placed more information on IBS. Please review at your convenience.    Return to GI Clinic in 2 months to review your progress.    ______________________________________________________________________  Irritable bowel syndrome (IBS)   Irritable bowel syndrome (IBS) is a chronic condition of the digestive system. Its primary symptoms are abdominal pain and changes in bowel habits (eg, constipation and/or diarrhea).  There are a number of theories about how and why irritable bowel syndrome (IBS) develops. Despite intensive research, the cause is not clear.   -One theory suggests that IBS is caused by abnormal contractions of the colon and intestines (hence the term \"spastic bowel,\" which has sometimes been used to describe IBS).   -Some people develop IBS after a severe gastrointestinal infection (eg, Salmonella or Campylobacter, or viruses). However, it is not clear how the infection triggers IBS to develop, and most people with IBS do not have a history of these infections.  -People with IBS who seek medical help are more likely to suffer from anxiety and stress than those who do not seek help. Stress and anxiety are known to affect the intestine; thus, it is likely that anxiety and stress worsen symptoms.  " "  -Food intolerances are common in patients with IBS, raising the possibility that it is caused by food sensitivity or allergy. This theory has been difficult to prove, although it continues to be studied.   A number of foods are known to cause symptoms that mimic or aggravate IBS, including dairy products (which contain lactose), legumes (such as beans), and cruciferous vegetables (such as broccoli, cauliflower, [place] sprouts, and cabbage). These foods increase intestinal gas, which can cause cramps.    -Many researchers believe that IBS is caused by heightened sensitivity of the intestines. The medical term for this is \"visceral hyperalgesia.\" This theory proposes that nerves in the bowels are overactive in people with IBS, so that normal amounts of gas or movement are perceived as excessive and painful.     A person with irritable bowel syndrome may have frequent loose stools. Bowel movements usually occur during the daytime, and most often in the morning or after meals. Diarrhea is often preceded by a sense of extreme urgency and followed by a feeling of incomplete emptying. About one-half of people with IBS also notice mucous discharge with diarrhea. Diarrhea occurring during the night is very unusual with IBS.     Treatments are often given to reduce the pain and other symptoms of IBS, and it may be necessary to try more than one combination of treatments to find the one that is most helpful for you.  Diet:  The first step in treating IBS is usually to monitor your symptoms, daily bowel habits, and any other factors that may affect your bowels. This can help to identify factors that worsen symptoms in some people with IBS, such as lactose or other food intolerances and stress. Keeping a daily diary to track your diet and bowel symptoms can be helpful.  Many clinicians recommend temporarily eliminating milk products, since lactose intolerance is common and can aggravate IBS or cause symptoms similar to IBS. " The greatest concentration of lactose is found in milk and ice cream, although it is present in smaller quantities in yogurt, cottage and other cheeses.Try eliminating dairy for 2 weeks. If IBS symptoms improve, it is reasonable to continue avoiding lactose.   Many foods are only partially digested in the small intestines. When they reach the colon (large intestine), further digestion takes place, which may cause gas and cramps. Eliminating these foods temporarily is reasonable if gas or bloating is bothersome.  The most common gas-producing foods are legumes (such as beans) and cruciferous vegetables (such as cabbage, [place] sprouts, cauliflower, and broccoli). In addition, some people have trouble with onions, celery, carrots, raisins, bananas, apricots, prunes, sprouts, and wheat.    A bulk-forming fiber supplement, such as Psyllium, may also be recommended to increase fiber intake since it is difficult to consume enough fiber in the diet. Fiber supplements should be started at a low dose and increased slowly over several weeks to reduce the symptoms of excessive intestinal gas, which can occur in some people when beginning fiber therapy.     Some foods are easy to digest for people with IBS related diarrhea. These include the following: plain pasta or noodles, white rice. Boiled or baked potato. Whole white bread, [place] bread, plain fish, plain chicken or turkey, soft-boiled eggs, rice or soy milk, cooked carrots,and cereals (plain Cornflakes, Rice Krispies, Corn or Rice Chex, or Cheerios).    Other approaches to management of IBS:  Stress and anxiety can worsen IBS in some people. The best approach for reducing stress and anxiety depends upon your situation and the severity of your symptoms.  Although many drugs are available to treat the symptoms of IBS, these drugs do not cure the condition. They are mainly used to relieve symptoms.      A high-fiber diet is a commonly recommended treatment for digestive  problems, such as constipation, diarrhea, diverticulosis, irritable bowel syndrome, and hemorrhoids.   Most dietary fiber is not digested or absorbed, so it stays within the intestine where it modulates digestion of other foods and affects the consistency of stool. There are two types of fiber, each of which is thought to have its own benefits:  ?Soluble fiber consists of a group of substances that is made of carbohydrates and dissolves in water. Examples of foods that contain soluble fiber include fruits, oats, barley, and legumes (peas and beans).  ?Insoluble fiber comes from plant cell walls and does not dissolve in water. Examples of foods that contain insoluble fiber include wheat, rye, and other grains, brown rice, quinoa, leafy greens, nuts, seeds, and skin-on fruits. Insoluble fiber has been recommended to treat digestive problems such as constipation, hemorrhoids, chronic diarrhea, and fecal incontinence.   ?Dietary fiber is the sum of all soluble and insoluble fiber.Fiber bulks the stool, making it softer and easier to pass. Fiber helps the stool pass regularly, although it is not a laxative.   - Recommended daily dose of fiber is 25-35 gram. It is difficult to consume this amount of fiber from food alone. Therefore, I would suggest to take fiber supplement.  - Please start supplementation with a powdered soluble fiber. When used on a daily basis, this can help regulate the consistency of your stools.   - Metamucil (psyllium) and Citrucel are preferred examples. You can start with 1-2 teaspoons per day, with goal to gradually increase the dose  to 1 tablespoon daily. You can increase up to 1 tablespoon three times daily if needed.   - It is important to stay well-hydrated with use of fiber supplementation and to make sure that the fiber powder is well mixed with water as directed on the label.   - You may experience some bloating with initiation of fiber, which will improve over the first few weeks. We  will evaluate the effect  of fiber in 3-6 months.   - Of note, many of the fiber products contain artificial sweeteners, which can cause bloating, gas, and diarrhea in those who may be sensitive to artificial sweeteners. If this is the case, I would recommend trying Metamucil Premium Blend (with Stevia), Metamucil 4-in-1 without Added Sweeteners, or [place] (sweetened with [place] fruit extract).   ____________________________________________________________________  Please see below for any additional questions and scheduling guidelines.    Sign up for GRAYL: GRAYL patient portal serves as a secure platform for accessing your medical records from the [facility]. Additionally, GRAYL facilitates easy, timely, and secure messaging with your care team. If you have not signed up, you may do so by using the provided code or calling [phone].    Coordinating your care after your visit:  There are multiple options for scheduling your follow-up care based on your provider's recommendation.    How do I schedule a follow-up clinic appointment:   After your appointment, you may receive scheduling assistance with the Clinic Coordinators by having a seat in the waiting room and a Clinic Coordinator will call you up to schedule.  Virtual visits or after you leave the clinic:  Your provider has placed a follow-up order in the GRAYL portal for scheduling your return appointment. A member of the scheduling team will contact you to schedule.  GRAYL Scheduling: Timely scheduling through GRAYL is advised to ensure appointment availability.   Call to schedule: You may schedule your follow-up appointment(s) by calling [phone], option 1.    How do I schedule my endoscopy or colonoscopy procedure:  If a procedure, such as a colonoscopy or upper endoscopy was ordered by your provider, the scheduling team will contact you to schedule this procedure. Or you may choose to call to schedule at   [phone],  option 2.  Please allow 20-30 minutes when scheduling a procedure.    How do I get my blood work done? To get your blood work done, you need to schedule a lab appointment at an [facility]. There are multiple ways to schedule:   At the clinic: The Clinic Coordinator you meet after your visit can help you schedule a lab appointment.   iAgree scheduling: iAgree offers online lab scheduling at all [facility] laboratory locations.   Call to schedule: You can call [phone] to schedule your lab appointment.    How do I schedule my imaging study: To schedule imaging studies, such as CT scans, ultrasounds, MRIs, or X-rays, contact Imaging Services at [phone].    How do I schedule a referral to another doctor: If your provider recommended a referral to another specialist(s), the referral order was placed by your provider. You will receive a phone call to schedule this referral, or you may choose to call the number attached to the referral to self-schedule.    For Post-Visit Question(s):  For any inquiries following today's visit:  Please utilize iAgree messaging and allow 48 hours for reply or contact the Call Center during normal business hours at [phone], option 3.  For Emergent After-hours questions, contact the On-Call GI Fellow through the [facility]  at [phone].  In addition, you may contact your Nurse directly using the provided contact information.    Test Results:  Test results will be accessible via iAgree in compliance with the 21st Century Cures Act. This means that your results will be available to you at the same time as your provider. Often you may see your results before your provider does. Results are reviewed by staff within two weeks with communication follow-up. Results may be released in the patient portal prior to your care team review.    Prescription Refill(s):  Medication prescribed by your provider will be addressed during your visit.  For future refills, please coordinate with your pharmacy. If you have not had a recent clinic visit or routine labs, for your safety, your provider may not be able to refill your prescription.     Sincerely,  SUSAN Salazar,  [facility],  Division of Gastroenterology   ([facility])

## 2025-01-06 ENCOUNTER — OFFICE VISIT (OUTPATIENT)
Dept: GASTROENTEROLOGY | Facility: CLINIC | Age: 30
End: 2025-01-06
Attending: PHYSICIAN ASSISTANT
Payer: COMMERCIAL

## 2025-01-06 VITALS
SYSTOLIC BLOOD PRESSURE: 120 MMHG | BODY MASS INDEX: 37.05 KG/M2 | HEIGHT: 75 IN | HEART RATE: 52 BPM | DIASTOLIC BLOOD PRESSURE: 66 MMHG | WEIGHT: 298 LBS

## 2025-01-06 DIAGNOSIS — R19.5 LOOSE STOOLS: Primary | ICD-10-CM

## 2025-01-06 DIAGNOSIS — K90.49 FOOD INTOLERANCE IN ADULT: ICD-10-CM

## 2025-01-06 DIAGNOSIS — K92.9 FUNCTIONAL GASTROINTESTINAL DISORDER: ICD-10-CM

## 2025-01-06 DIAGNOSIS — R10.9 ABDOMINAL DISCOMFORT: ICD-10-CM

## 2025-01-06 LAB — MAGNESIUM SERPL-MCNC: 1.9 MG/DL (ref 1.7–2.3)

## 2025-01-06 PROCEDURE — 99204 OFFICE O/P NEW MOD 45 MIN: CPT | Performed by: NURSE PRACTITIONER

## 2025-01-06 PROCEDURE — 36415 COLL VENOUS BLD VENIPUNCTURE: CPT | Performed by: NURSE PRACTITIONER

## 2025-01-06 PROCEDURE — 83735 ASSAY OF MAGNESIUM: CPT | Performed by: NURSE PRACTITIONER

## 2025-01-06 PROCEDURE — 86364 TISS TRNSGLTMNASE EA IG CLAS: CPT | Performed by: NURSE PRACTITIONER

## 2025-01-06 PROCEDURE — 86003 ALLG SPEC IGE CRUDE XTRC EA: CPT | Performed by: NURSE PRACTITIONER

## 2025-01-06 ASSESSMENT — PAIN SCALES - GENERAL: PAINLEVEL_OUTOF10: NO PAIN (0)

## 2025-01-06 NOTE — LETTER
"1/6/2025      Guillermo Terrell  [address] [facility] [address]      Dear Colleague,    Thank you for referring your patient, Guillermo Terrell, to the [facility]. Please see a copy of my visit note below.      [facility]  [address] [facility] [address]  Phone: [phone]    Patient:  Guillermo Terrell, Date of birth 1995    Referring Provider Roger Solo    Gastroenterology CLINIC VISIT, NEW PATIENT    REASON FOR CONSULTATION: food intolerance    HPI: 29 year old male was referred to GI clinic for a consultation of \"loose stools when consuming veggies\". Patient stated that approximately 8 or 10 years ago, he started noticing that every time he consumes fresh veggies, eggs, or salads, in about an hour, he would have several loose stools  Some days, he may have up to 10 stools a day. Stated that abdominal cramping usually precedes defecation and resolves after having bowel movement. Said that he is on regular diet. Interestingly, cooked veggies do not cause any symptoms. Patient describes his bowel movements as Madison 3 and 4, except when he has bouts of diarrhea. No hematochezia or melena. No nausea or vomiting. No dysphagia or odynophagia. Patient stated that he is working on weight loss by reducing his portion size.      PREVIOUS ENDOSCOPY:  None    PERTINENT STUDIES Reviewed in EMR      ROS: 10pt ROS performed and otherwise negative.    PAST MEDICAL HISTORY:  Past Medical History:   Diagnosis Date     Elevated blood pressure reading without diagnosis of hypertension 12/16/2019     Lyme disease 2000       PREVIOUS ABDOMINAL/GYNECOLOGIC SURGERIES:  Past Surgical History:   Procedure Laterality Date     ENDOSCOPIC BALLOON SINUPLASTY ACCLARENT Bilateral 5/11/2021    Procedure: ENDOSCOPIC BALLOON SINUPLASTY ACCLARENT;  Surgeon: Timur Triana MD;  Location: [facility] OR     SEPTOPLASTY, TURBINOPLASTY, COMBINED Bilateral 5/11/2021    Procedure: SEPTOPLASTY, " NOSE, WITH submucous resection of turbinates;  Surgeon: Timur Triana MD;  Location:  OR     TONSILLECTOMY & ADENOIDECTOMY  12/06         PERTINENT MEDICATIONS:  Current Outpatient Medications   Medication Sig Dispense Refill     fluticasone (FLONASE) 50 MCG/ACT nasal spray Spray 1 spray into both nostrils daily       losartan (COZAAR) 25 MG tablet Take 1 tablet (25 mg) by mouth daily. 90 tablet 3     methylcellulose (CITRUCEL) 500 MG TABS tablet Take 2 tablets (1,000 mg) by mouth 2 times daily. 120 tablet 5         SOCIAL HISTORY:  Social History     Socioeconomic History     Marital status: Single     Spouse name: Not on file     Number of children: Not on file     Years of education: Not on file     Highest education level: Not on file   Occupational History     Occupation: heating and air   Tobacco Use     Smoking status: Former     Types: Cigarettes     Smokeless tobacco: Former     Types: Chew     Quit date: 07/2024     Tobacco comments:     sometimes   Vaping Use     Vaping status: Some Days   Substance and Sexual Activity     Alcohol use: Yes     Drug use: Not Currently     Types: Marijuana     Comment: not for over a year 11/24/2020     Sexual activity: Yes   Other Topics Concern      Service Not Asked     Blood Transfusions Not Asked     Caffeine Concern Not Asked     Occupational Exposure Not Asked     Hobby Hazards Not Asked     Sleep Concern Not Asked     Stress Concern Not Asked     Weight Concern Not Asked     Special Diet Not Asked     Back Care Not Asked     Exercise Not Asked     Bike Helmet Not Asked     Seat Belt Not Asked     Self-Exams Not Asked     Parent/sibling w/ CABG, MI or angioplasty before 65F 55M? Not Asked   Social History Narrative    6/20/23    ENVIRONMENTAL HISTORY: [place] The home is heated with a forced air. They do have central air conditioning. The patient's bedroom is furnished with feather/wool bedding or pillows and  carpeting in bedroom.  Pets inside the house include 0 pets. There is no history of cockroach or mice infestation. There is/are 0 smokers in the house.  The house does not have a damp basement.      Social Drivers of Health     Financial Resource Strain: Low Risk  (9/12/2024)    Financial Resource Strain      Within the past 12 months, have you or your family members you live with been unable to get utilities (heat, electricity) when it was really needed?: No   Food Insecurity: Low Risk  (9/12/2024)    Food Insecurity      Within the past 12 months, did you worry that your food would run out before you got money to buy more?: No      Within the past 12 months, did the food you bought just not last and you didn t have money to get more?: No   Transportation Needs: Low Risk  (9/12/2024)    Transportation Needs      Within the past 12 months, has lack of transportation kept you from medical appointments, getting your medicines, non-medical meetings or appointments, work, or from getting things that you need?: No   Physical Activity: Unknown (9/12/2024)    Exercise Vital Sign      Days of Exercise per Week: 7 days      Minutes of Exercise per Session: Not on file   Stress: Stress Concern Present (9/12/2024)    [employer] - Occupational Stress Questionnaire      Feeling of Stress : Rather much   Social Connections: Unknown (9/12/2024)    Social Connection and Isolation Panel [NHANES]      Frequency of Communication with Friends and Family: Not on file      Frequency of Social Gatherings with Friends and Family: More than three times a week      Attends Orthodox Services: Not on file      Active Member of Clubs or Organizations: Not on file      Attends Club or Organization Meetings: Not on file      Marital Status: Not on file   Interpersonal Safety: Low Risk  (9/12/2024)    Interpersonal Safety      Do you feel physically and emotionally safe where you currently live?: Yes      Within the  "past 12 months, have you been hit, slapped, kicked or otherwise physically hurt by someone?: No      Within the past 12 months, have you been humiliated or emotionally abused in other ways by your partner or ex-partner?: No   Housing Stability: Low Risk  (9/12/2024)    Housing Stability      Do you have housing? : Yes      Are you worried about losing your housing?: No       FAMILY HISTORY:  Denies colon/panc/esophageal/other GI CA, no other Squires or other HPS-related Ruthie. No IBD/celiac, no other AI/liver/thyroid disease.    Family History   Problem Relation Age of Onset     Diabetes Mother         Type 1     Breast Cancer Maternal Grandmother      Circulatory Paternal Grandmother         Annuersym     Diabetes Other         paternal cousin Type 1     Asthma No family hx of        PHYSICAL EXAMINATION:  Vitals reviewed  /66 (BP Location: Right arm, Patient Position: Sitting, Cuff Size: Adult Large)   Pulse 52   Ht 1.911 m (6' 3.25\")   Wt 135.2 kg (298 lb)   BMI 37.00 kg/m      General: Patient appears well in no acute distress.    Skin: No visualized rash or lesions on visualized skin  HEENT:    EOMI, no erythema, sclera icterus or discharge noted.  Mouth mucosa intact, pink, moist. Mild erythema of posterior pharynx.  No cervical or supraclavicular lymphadenopathy. Thyroid gland not enlarged.  Resp: breathing comfortably without accessory muscle usage, speaking in full sentences, no cough. Lung sounds clear  Card: Regular and rhythmic S1 and S2. No gallop or rub. No murmur.  No LE edema.  Abdomen: Active bowel sounds X 4 quadrants. Soft to palpation.  No guarding or rebound tenderness. Martins's sign negative.  MSK: Appears to have normal range of motion based on visualized movements  Neurologic: No apparent tremors, facial movements symmetric  Psych: affect normal, alert and oriented    Recent Labs:  Recent Labs   Lab Test 09/12/24  1534 06/20/23  1603   WBC 8.1 7.0   HGB 14.7 14.6   HCT 44.0 44.5   PLT " "251 267     Recent Labs   Lab Test 09/12/24  1534   ALT 40   AST 27     TSH   Date Value Ref Range Status   10/24/2024 1.40 0.30 - 4.20 uIU/mL Final     Recent Labs   Lab Test 09/12/24  1534   CR 0.91         ASSESSMENT/PLAN:    ICD-10-CM    1. Loose stools  R19.5 Tissue transglutaminase siobhan IgA and IgG     Allergy adult food panel     Magnesium     CT Abdomen Pelvis w/o & w Contrast     methylcellulose (CITRUCEL) 500 MG TABS tablet     Magnesium     Allergy adult food panel     Tissue transglutaminase siobhan IgA and IgG      2. Food intolerance in adult - near diarrhea with vegetables  K90.49 Adult GI  Referral - Consult Only     Tissue transglutaminase siobhan IgA and IgG     Allergy adult food panel     Allergy adult food panel     Tissue transglutaminase siobhan IgA and IgG      3. Functional gastrointestinal disorder  K92.9 methylcellulose (CITRUCEL) 500 MG TABS tablet      4. Abdominal discomfort  R10.9 CT Abdomen Pelvis w/o & w Contrast         29 year old male  presented to GI clinic for a consultation on bouts of loose stools that occur 1-2 times a week, usually after he consumes eggs or fresh veggies. May have up to 10 watery stools a day. Otherwise, he has formed stools every day. Reported some abdominal discomfort preceding defecation and resolving after he has a bowel movement.   He denies any other GI symptoms such as heart burn, nausea, vomiting, hematochezia, melena, dysphagia, poor appetite, or weight loss. Patient mentioned that he used to drink alcohol, 10+ drinks during weekdays and \"a little more\" on the weekends. Decided to cut down to 1-2 drinks a week. Stated that his diarrhea was worse when he was drinking heavy.  Noted that the patient was seen and evaluated by Dr. Leos  for chronic sinusitis and year around allergies. Total serum IgE was within normal limits. Serum IgE for regional aeroallergen panel showed slight sensitivity to pollen of maple tree.    Discussed differential diagnosis " with the patient including but not limited to functional GI tract disorder such as IBS, lactose or gluten sensitivity or intolerance, fructose malabsorption, overflow diarrhea, microscopic colitis, alcohol induced diarrhea, or least likely, intestinal infection, IBD, or EPI. Explained to the patient that I don't believe there would be benefits of colonoscopy at this time.  Patient would like to complete laboratory tests for food allergy and screening for celiac disease.   CT scan of abdomen and pelvis was ordered.  I recommended to try a small dose of Citrucel and to taper it up.  Avoid foods that trigger symptoms.  Keep a food/symptoms diary.  Patient verbalized understanding and appreciation of care provided. Stated that all of the questions were answered to her/his satisfaction.  RTC in 2 months    Thank you for this consultation. It was a pleasure to participate in the care of this patient; please contact us with any further questions.    GEOVANY Salazar, FNP-C  Division of Gastroenterology  [facility], [place], [place]    This note was created with Dragon voice recognition software, and while reviewed for accuracy, inadvertent minor typographic errors may occur. Please contact the provider if you have any questions.       Again, thank you for allowing me to participate in the care of your patient.        Sincerely,        GEOVANY SALAZAR CNP    Electronically signed

## 2025-01-07 LAB
ALMOND IGE QN: <0.1 KU(A)/L
CASHEW NUT IGE QN: <0.1 KU(A)/L
CODFISH IGE QN: <0.1 KU(A)/L
COW MILK IGE QN: <0.1 KU(A)/L
EGG WHITE IGE QN: <0.1 KU(A)/L
HAZELNUT IGE QN: <0.1 KU(A)/L
IGE SERPL-ACNC: 12 KU/L (ref 0–114)
PEANUT IGE QN: <0.1 KU(A)/L
SALMON IGE QN: <0.1 KU(A)/L
SCALLOP IGE QN: <0.1 KU(A)/L
SESAME SEED IGE QN: <0.1 KU(A)/L
SHRIMP IGE QN: <0.1 KU(A)/L
SOYBEAN IGE QN: <0.1 KU(A)/L
TTG IGA SER-ACNC: 0.6 U/ML
TTG IGG SER-ACNC: 0.6 U/ML
TUNA IGE QN: <0.1 KU(A)/L
WALNUT IGE QN: <0.1 KU(A)/L
WHEAT IGE QN: <0.1 KU(A)/L

## 2025-01-21 ENCOUNTER — HOSPITAL ENCOUNTER (OUTPATIENT)
Dept: CT IMAGING | Facility: CLINIC | Age: 30
Discharge: HOME | End: 2025-01-21
Attending: NURSE PRACTITIONER
Payer: COMMERCIAL

## 2025-01-21 DIAGNOSIS — R10.9 ABDOMINAL DISCOMFORT: ICD-10-CM

## 2025-01-21 DIAGNOSIS — R19.5 LOOSE STOOLS: ICD-10-CM

## 2025-01-21 PROCEDURE — 74177 CT ABD & PELVIS W/CONTRAST: CPT

## 2025-01-21 PROCEDURE — 250N000011 HC RX IP 250 OP 636: Performed by: NURSE PRACTITIONER

## 2025-01-21 PROCEDURE — 250N000009 HC RX 250: Performed by: NURSE PRACTITIONER

## 2025-01-21 RX ORDER — IOPAMIDOL 755 MG/ML
500 INJECTION, SOLUTION INTRAVASCULAR ONCE
Status: COMPLETED | OUTPATIENT
Start: 2025-01-21 | End: 2025-01-21

## 2025-01-21 RX ADMIN — IOPAMIDOL 100 ML: 755 INJECTION, SOLUTION INTRAVENOUS at 15:33

## 2025-01-21 RX ADMIN — SODIUM CHLORIDE 60 ML: 9 INJECTION, SOLUTION INTRAVENOUS at 15:33

## 2025-01-23 NOTE — RESULT ENCOUNTER NOTE
Writer sent [employer] message with update on results    Thank you,  MAINOR DonnellyN RN  [facility]

## 2025-03-30 NOTE — PATIENT INSTRUCTIONS
"It was a pleasure taking care of you today.  I've included a brief summary of our discussion and care plan from today's visit below.  Please review this information with your primary care provider.  ______________________________________________________________________    My recommendations are summarized as follows:    As we discussed today, your GI symptoms are likely related to functional bowel disease. Please keep a food diary, recording possible triggers of your irregular stool pattern.    2.  Try another type of a fiber supplement. I placed more information on fiber below. Please review at your convenience.    Return to GI Clinic as needed   ______________________________________________________________________  Irritable bowel syndrome (IBS)   Irritable bowel syndrome (IBS) is a chronic condition of the digestive system. Its primary symptoms are abdominal pain and changes in bowel habits (eg, constipation and/or diarrhea).  There are a number of theories about how and why irritable bowel syndrome (IBS) develops. Despite intensive research, the cause is not clear.   -One theory suggests that IBS is caused by abnormal contractions of the colon and intestines (hence the term \"spastic bowel,\" which has sometimes been used to describe IBS).   -Some people develop IBS after a severe gastrointestinal infection (eg, Salmonella or Campylobacter, or viruses). However, it is not clear how the infection triggers IBS to develop, and most people with IBS do not have a history of these infections.  -People with IBS who seek medical help are more likely to suffer from anxiety and stress than those who do not seek help. Stress and anxiety are known to affect the intestine; thus, it is likely that anxiety and stress worsen symptoms.    -Food intolerances are common in patients with IBS, raising the possibility that it is caused by food sensitivity or allergy. This theory has been difficult to prove, although it continues to be " "studied.   A number of foods are known to cause symptoms that mimic or aggravate IBS, including dairy products (which contain lactose), legumes (such as beans), and cruciferous vegetables (such as broccoli, cauliflower, [place] sprouts, and cabbage). These foods increase intestinal gas, which can cause cramps.    -Many researchers believe that IBS is caused by heightened sensitivity of the intestines. The medical term for this is \"visceral hyperalgesia.\" This theory proposes that nerves in the bowels are overactive in people with IBS, so that normal amounts of gas or movement are perceived as excessive and painful.     A person with irritable bowel syndrome may have frequent loose stools. Bowel movements usually occur during the daytime, and most often in the morning or after meals. Diarrhea is often preceded by a sense of extreme urgency and followed by a feeling of incomplete emptying. About one-half of people with IBS also notice mucous discharge with diarrhea. Diarrhea occurring during the night is very unusual with IBS.     Treatments are often given to reduce the pain and other symptoms of IBS, and it may be necessary to try more than one combination of treatments to find the one that is most helpful for you.  Diet:  The first step in treating IBS is usually to monitor your symptoms, daily bowel habits, and any other factors that may affect your bowels. This can help to identify factors that worsen symptoms in some people with IBS, such as lactose or other food intolerances and stress. Keeping a daily diary to track your diet and bowel symptoms can be helpful.  Many clinicians recommend temporarily eliminating milk products, since lactose intolerance is common and can aggravate IBS or cause symptoms similar to IBS. The greatest concentration of lactose is found in milk and ice cream, although it is present in smaller quantities in yogurt, cottage and other cheeses.Try eliminating dairy for 2 weeks. If IBS " symptoms improve, it is reasonable to continue avoiding lactose.   Many foods are only partially digested in the small intestines. When they reach the colon (large intestine), further digestion takes place, which may cause gas and cramps. Eliminating these foods temporarily is reasonable if gas or bloating is bothersome.  The most common gas-producing foods are legumes (such as beans) and cruciferous vegetables (such as cabbage, [place] sprouts, cauliflower, and broccoli). In addition, some people have trouble with onions, celery, carrots, raisins, bananas, apricots, prunes, sprouts, and wheat.    A bulk-forming fiber supplement, such as Psyllium, may also be recommended to increase fiber intake since it is difficult to consume enough fiber in the diet. Fiber supplements should be started at a low dose and increased slowly over several weeks to reduce the symptoms of excessive intestinal gas, which can occur in some people when beginning fiber therapy.     Some foods are easy to digest for people with IBS related diarrhea. These include the following: plain pasta or noodles, white rice. Boiled or baked potato. Whole white bread, [place] bread, plain fish, plain chicken or turkey, soft-boiled eggs, rice or soy milk, cooked carrots,and cereals (plain Cornflakes, Rice Krispies, Corn or Rice Chex, or Cheerios).    Other approaches to management of IBS:  Stress and anxiety can worsen IBS in some people. The best approach for reducing stress and anxiety depends upon your situation and the severity of your symptoms.  Although many drugs are available to treat the symptoms of IBS, these drugs do not cure the condition. They are mainly used to relieve symptoms.      A high-fiber diet is a commonly recommended treatment for digestive problems, such as constipation, diarrhea, diverticulosis, irritable bowel syndrome, and hemorrhoids.   Most dietary fiber is not digested or absorbed, so it stays within the intestine where it  modulates digestion of other foods and affects the consistency of stool. There are two types of fiber, each of which is thought to have its own benefits:  ?Soluble fiber consists of a group of substances that is made of carbohydrates and dissolves in water. Examples of foods that contain soluble fiber include fruits, oats, barley, and legumes (peas and beans).  ?Insoluble fiber comes from plant cell walls and does not dissolve in water. Examples of foods that contain insoluble fiber include wheat, rye, and other grains, brown rice, quinoa, leafy greens, nuts, seeds, and skin-on fruits. Insoluble fiber has been recommended to treat digestive problems such as constipation, hemorrhoids, chronic diarrhea, and fecal incontinence.   ?Dietary fiber is the sum of all soluble and insoluble fiber.Fiber bulks the stool, making it softer and easier to pass. Fiber helps the stool pass regularly, although it is not a laxative.   - Recommended daily dose of fiber is 25-35 gram. It is difficult to consume this amount of fiber from food alone. Therefore, I would suggest to take fiber supplement.  - Please start supplementation with a powdered soluble fiber. When used on a daily basis, this can help regulate the consistency of your stools.   - Metamucil (psyllium) and Citrucel are preferred examples. You can start with 1-2 teaspoons per day, with goal to gradually increase the dose  to 1 tablespoon daily. You can increase up to 1 tablespoon three times daily if needed.   - It is important to stay well-hydrated with use of fiber supplementation and to make sure that the fiber powder is well mixed with water as directed on the label.   - You may experience some bloating with initiation of fiber, which will improve over the first few weeks. We will evaluate the effect  of fiber in 3-6 months.   - Of note, many of the fiber products contain artificial sweeteners, which can cause bloating, gas, and diarrhea in those who may be sensitive  to artificial sweeteners. If this is the case, I would recommend trying Metamucil Premium Blend (with Stevia), Metamucil 4-in-1 without Added Sweeteners, or [place] (sweetened with [place]k fruit extract).   ____________________________________________________________________  Please see below for any additional questions and scheduling guidelines.    Sign up for [employer]: [employer] patient portal serves as a secure platform for accessing your medical records from the [facility]. Additionally, [employer] facilitates easy, timely, and secure messaging with your care team. If you have not signed up, you may do so by using the provided code or calling [phone].    Coordinating your care after your visit:  There are multiple options for scheduling your follow-up care based on your provider's recommendation.    How do I schedule a follow-up clinic appointment:   After your appointment, you may receive scheduling assistance with the Clinic Coordinators by having a seat in the waiting room and a Clinic Coordinator will call you up to schedule.  Virtual visits or after you leave the clinic:  Your provider has placed a follow-up order in the [employer] portal for scheduling your return appointment. A member of the scheduling team will contact you to schedule.  [employer] Scheduling: Timely scheduling through [employer] is advised to ensure appointment availability.   Call to schedule: You may schedule your follow-up appointment(s) by calling [phone], option 1.    How do I schedule my endoscopy or colonoscopy procedure:  If a procedure, such as a colonoscopy or upper endoscopy was ordered by your provider, the scheduling team will contact you to schedule this procedure. Or you may choose to call to schedule at   [phone], option 2.  Please allow 20-30 minutes when scheduling a procedure.    How do I get my blood work done? To get your blood work done, you need to schedule a lab appointment at [facility]. There are multiple ways to schedule:   At the clinic: The Clinic Coordinator you meet after your visit can help you schedule a lab appointment.   [employer] scheduling: [employer] offers online lab scheduling at all [facility] laboratory locations.   Call to schedule: You can call [phone] to schedule your lab appointment.    How do I schedule my imaging study: To schedule imaging studies, such as CT scans, ultrasounds, MRIs, or X-rays, contact Imaging Services at [phone].    How do I schedule a referral to another doctor: If your provider recommended a referral to another specialist(s), the referral order was placed by your provider. You will receive a phone call to schedule this referral, or you may choose to call the number attached to the referral to self-schedule.    For Post-Visit Question(s):  For any inquiries following today's visit:  Please utilize [employer] messaging and allow 48 hours for reply or contact the Call Center during normal business hours at [phone], option 3.  For Emergent After-hours questions, contact the On-Call GI Fellow through the [facility]  at [phone].  In addition, you may contact your Nurse directly using the provided contact information.    Test Results:  Test results will be accessible via [employer] in compliance with the 21st Century Cures Act. This means that your results will be available to you at the same time as your provider. Often you may see your results before your provider does. Results are reviewed by staff within two weeks with communication follow-up. Results may be released in the patient portal prior to your care team review.    Prescription Refill(s):  Medication prescribed by your provider will be addressed during your visit. For future refills, please coordinate with your pharmacy. If you have not had a recent clinic visit or routine labs, for your safety, your provider may not be able to refill your prescription.      Sincerely,  SUSAN Salazar,  [facility],  Division of Gastroenterology   ([facility])

## 2025-03-30 NOTE — PROGRESS NOTES
"  [address] [address]  Phone: [phone]    Patient:  Guillermo Terrell, Date of birth 1995    Referring Provider: Roger Solo       Gastroenterology CLINIC VISIT, RETURN PATIENT    REASON FOR CONSULTATION: follow up    HPI: 29 year old male presented to GI clinic for a follow up. The patient was seen for loose stools that he relates to consumption of certain foods (salads, eggs, alcohol, veggies). Reported some abdominal discomfort preceding defecation and resolving after he has a bowel movement. CT scan of abdomen and pelvis was ordered and came back unremarkable Had negative food allergy panel and screening for celiac disease. I recommended to try a small dose of Citrucel and to taper it up. Patient said that he tried it for 2 week but Citrucel made his symptoms worse- was feeling constipated and \"dehydrated\". He stopped the supplement.  Reported no significant changes in his stools, they are ranging from a few loose [place] type 6 or 7 to formed stools. Random abdominal cramping is still present.  Patient stated that he is working on diet change, staying away from greasy foods and raw veggies and this helps. He also cut down on alcohol intake.    Noted that in the past, the patient was seen and evaluated by Dr. Leos  for chronic sinusitis and year around allergies. Total serum IgE was within normal limits. Serum IgE for regional aeroallergen panel showed slight sensitivity to pollen of maple tree.       PREVIOUS ENDOSCOPY:  None  PERTINENT STUDIES Reviewed in EMR  1/21/2025 CT abdomen/pelvis  FINDINGS:   LOWER CHEST: Clear visualized lung bases.   HEPATOBILIARY: No focal hepatic mass, biliary dilatation, or calcified gallstones.   PANCREAS: No focal pancreatic mass, peripancreatic inflammation, or pancreatic ductal dilatation.   SPLEEN: Normal size.   ADRENAL GLANDS: No nodules.   KIDNEYS/BLADDER: No suspicious renal mass or hydronephrosis. No " urinary bladder wall thickening.   BOWEL: Unremarkable appearance of the visualized portions of the distal esophagus, stomach, and small bowel. The appendix is normal in caliber and without periappendiceal inflammation. The colon appears unremarkable. No ascites.   LYMPH NODES: No abdominal or pelvic lymphadenopathy.   VASCULATURE: Normal caliber abdominal aorta. Patent portal, splenic, and mesenteric veins. Patent bilateral renal veins.   PELVIC ORGANS: No large pelvic mass.   MUSCULOSKELETAL: No aggressive osseous lesion.                                                        IMPRESSION:   1.  No findings to explain patient's reported symptoms.    ROS: 10pt ROS performed and otherwise negative.    PAST MEDICAL HISTORY:  Past Medical History:   Diagnosis Date    Elevated blood pressure reading without diagnosis of hypertension 12/16/2019    Lyme disease 2000       PREVIOUS ABDOMINAL/GYNECOLOGIC SURGERIES:  Past Surgical History:   Procedure Laterality Date    ENDOSCOPIC BALLOON SINUPLASTY ACCLARENT Bilateral 5/11/2021    Procedure: ENDOSCOPIC BALLOON SINUPLASTY ACCLARENT;  Surgeon: Timru Triana MD;  Location: [facility]    SEPTOPLASTY, TURBINOPLASTY, COMBINED Bilateral 5/11/2021    Procedure: SEPTOPLASTY, NOSE, WITH submucous resection of turbinates;  Surgeon: Timur Triana MD;  Location: [facility]    TONSILLECTOMY & ADENOIDECTOMY  12/06         PERTINENT MEDICATIONS:  Current Outpatient Medications   Medication Sig Dispense Refill    fluticasone (FLONASE) 50 MCG/ACT nasal spray Spray 1 spray into both nostrils daily      losartan (COZAAR) 25 MG tablet Take 1 tablet (25 mg) by mouth daily. 90 tablet 3         SOCIAL HISTORY:  Social History     Socioeconomic History    Marital status: Single     Spouse name: Not on file    Number of children: Not on file    Years of education: Not on file    Highest education level: Not on file   Occupational History    Occupation: heating and air   Tobacco Use    Smoking status:  Former     Types: Cigarettes    Smokeless tobacco: Former     Types: Chew     Quit date: 07/2024    Tobacco comments:     sometimes   Vaping Use    Vaping status: Some Days   Substance and Sexual Activity    Alcohol use: Yes    Drug use: Not Currently     Types: Marijuana     Comment: not for over a year 11/24/2020    Sexual activity: Yes   Other Topics Concern     Service Not Asked    Blood Transfusions Not Asked    Caffeine Concern Not Asked    Occupational Exposure Not Asked    Hobby Hazards Not Asked    Sleep Concern Not Asked    Stress Concern Not Asked    Weight Concern Not Asked    Special Diet Not Asked    Back Care Not Asked    Exercise Not Asked    Bike Helmet Not Asked    Seat Belt Not Asked    Self-Exams Not Asked    Parent/sibling w/ CABG, MI or angioplasty before 65F 55M? Not Asked   Social History Narrative    6/20/23    ENVIRONMENTAL HISTORY: [place] is heated with a forced air. They do have central air conditioning. The patient's bedroom is furnished with feather/wool bedding or pillows and carpeting in bedroom.  Pets inside the house include 0 pets. There is no history of cockroach or mice infestation. There is/are 0 smokers in the house.  The house does not have a damp basement.      Social Drivers of Health     Financial Resource Strain: Low Risk  (9/12/2024)    Financial Resource Strain     Within the past 12 months, have you or your family members you live with been unable to get utilities (heat, electricity) when it was really needed?: No   Food Insecurity: Low Risk  (9/12/2024)    Food Insecurity     Within the past 12 months, did you worry that your food would run out before you got money to buy more?: No     Within the past 12 months, did the food you bought just not last and you didn t have money to get more?: No   Transportation Needs: Low Risk  (9/12/2024)    Transportation Needs     Within the past 12 months, has lack of  transportation kept you from medical appointments, getting your medicines, non-medical meetings or appointments, work, or from getting things that you need?: No   Physical Activity: Unknown (9/12/2024)    Exercise Vital Sign     Days of Exercise per Week: 7 days     Minutes of Exercise per Session: Not on file   Stress: Stress Concern Present (9/12/2024)    [employer] [place] [employer] - Occupational Stress Questionnaire     Feeling of Stress : Rather much   Social Connections: Unknown (9/12/2024)    Social Connection and Isolation Panel [NHANES]     Frequency of Communication with Friends and Family: Not on file     Frequency of Social Gatherings with Friends and Family: More than three times a week     Attends Christianity Services: Not on file     Active Member of Clubs or Organizations: Not on file     Attends Club or Organization Meetings: Not on file     Marital Status: Not on file   Interpersonal Safety: Low Risk  (9/12/2024)    Interpersonal Safety     Do you feel physically and emotionally safe where you currently live?: Yes     Within the past 12 months, have you been hit, slapped, kicked or otherwise physically hurt by someone?: No     Within the past 12 months, have you been humiliated or emotionally abused in other ways by your partner or ex-partner?: No   Housing Stability: Low Risk  (9/12/2024)    Housing Stability     Do you have housing? : Yes     Are you worried about losing your housing?: No       FAMILY HISTORY:  Denies colon/panc/esophageal/other GI CA, no other Squires or other HPS-related Ruthie. No IBD/celiac, no other AI/liver/thyroid disease.    Family History   Problem Relation Age of Onset    Diabetes Mother         Type 1    Breast Cancer Maternal Grandmother     Circulatory Paternal Grandmother         Annuersym    Diabetes Other         paternal cousin Type 1    Asthma No family hx of        PHYSICAL EXAMINATION:  Vitals reviewed  /70 (BP Location: Right arm, Patient  "Position: Sitting, Cuff Size: Adult Large)   Pulse (!) 48   Ht 1.911 m (6' 3.25\")   Wt 130.2 kg (287 lb)   BMI 35.63 kg/m      General: Patient appears well in no acute distress.    Skin: No visualized rash or lesions on visualized skin  HEENT:    EOMI, no erythema, sclera icterus or discharge noted.  Mouth mucosa intact, pink, moist  No cervical or supraclavicular lymphadenopathy. Thyroid gland not enlarged.  Resp: breathing comfortably without accessory muscle usage, speaking in full sentences, no cough. Lung sounds clear  Card: Regular and rhythmic S1 and S2. No gallop or rub. No murmur.  No LE edema.  Abdomen: Active bowel sounds X 4 quadrants. Soft to palpation.  No guarding or rebound tenderness. Martins's sign negative.  MSK: Appears to have normal range of motion based on visualized movements  Neurologic: No apparent tremors, facial movements symmetric  Psych: affect normal, alert and oriented      ASSESSMENT/PLAN:    ICD-10-CM    1. Functional gastrointestinal disorder  K92.9       2. Abdominal discomfort  R10.9       3. Alternating constipation and diarrhea  R19.8          29 year old male  presented to GI clinic for a follow up. I saw the patient for complains of irregular stools that started about 8 or 10 years ago. He suspected some food sensitivity or allergies because every time he consumes raw veggies, eggs, or alcohol, he develops diarrhea.  Some days, he is having up to 10 stools a day.  Other times, his stools are formed and he may have 1 or 2 bowel movements a day.  Complained of generalized abdominal discomfort preceding bowel movements.  He denies any other GI symptoms such as heart burn, nausea, vomiting, hematochezia, melena, dysphagia, poor appetite, or weight loss. Patient mentioned that he used to drink alcohol, 10+ drinks during weekdays and \"a little more\" on the weekends. Decided to cut down to 1-2 drinks a week. Stated that his diarrhea was worse when he was drinking heavy. "     Patient screened negative for celiac disease and food allergies.  He had unremarkable CT scan of abdomen and pelvis.  Stated he is not able to tolerate Citrucel because it was making his constipation worse.  We talked about functional GI tract disorder as the most likely etiology of his symptoms.  I also mentioned chronic constipation with overflow diarrhea.  Recommended to keep a food diary to identify potential triggers of his symptoms.  Continue working on reduction of alcohol intake.  May try different type of fiber or MiraLAX daily.  Educated on red flag symptoms and when to seek medical attention.    Patient verbalized understanding and appreciation of care provided. Stated that all of the questions were answered to her/his satisfaction.  RTC as needed    Thank you for this consultation. It was a pleasure to participate in the care of this patient; please contact us with any further questions.    GEOVANY Salazar, KELSIP-C  Division of Gastroenterology  [facility], [place], [place]    This note was created with Dragon voice recognition software, and while reviewed for accuracy, inadvertent minor typographic errors may occur. Please contact the provider if you have any questions.

## 2025-03-31 ENCOUNTER — OFFICE VISIT (OUTPATIENT)
Dept: GASTROENTEROLOGY | Facility: CLINIC | Age: 30
End: 2025-03-31
Attending: NURSE PRACTITIONER
Payer: COMMERCIAL

## 2025-03-31 VITALS
BODY MASS INDEX: 35.68 KG/M2 | SYSTOLIC BLOOD PRESSURE: 118 MMHG | HEIGHT: 75 IN | WEIGHT: 287 LBS | HEART RATE: 48 BPM | DIASTOLIC BLOOD PRESSURE: 70 MMHG

## 2025-03-31 DIAGNOSIS — R10.9 ABDOMINAL DISCOMFORT: ICD-10-CM

## 2025-03-31 DIAGNOSIS — K92.9 FUNCTIONAL GASTROINTESTINAL DISORDER: Primary | ICD-10-CM

## 2025-03-31 DIAGNOSIS — R19.8 ALTERNATING CONSTIPATION AND DIARRHEA: ICD-10-CM

## 2025-03-31 PROCEDURE — 3074F SYST BP LT 130 MM HG: CPT | Performed by: NURSE PRACTITIONER

## 2025-03-31 PROCEDURE — 3078F DIAST BP <80 MM HG: CPT | Performed by: NURSE PRACTITIONER

## 2025-03-31 PROCEDURE — 1126F AMNT PAIN NOTED NONE PRSNT: CPT | Performed by: NURSE PRACTITIONER

## 2025-03-31 PROCEDURE — 99214 OFFICE O/P EST MOD 30 MIN: CPT | Performed by: NURSE PRACTITIONER

## 2025-03-31 ASSESSMENT — PAIN SCALES - GENERAL: PAINLEVEL_OUTOF10: NO PAIN (0)

## 2025-03-31 NOTE — LETTER
"3/31/2025      Guillermo Terrell  [address] [facility] [address]      Dear Colleague,    Thank you for referring your patient, Guillermo Terrell, to the [facility]. Please see a copy of my visit note below.      [facility]  [address] [facility] [address]  Phone: [phone]    Patient:  Guillermo Terrell, Date of birth 1995    Referring Provider: Roger Solo       Gastroenterology CLINIC VISIT, RETURN PATIENT    REASON FOR CONSULTATION: follow up    HPI: 29 year old male presented to GI clinic for a follow up. The patient was seen for loose stools that he relates to consumption of certain foods (salads, eggs, alcohol, veggies). Reported some abdominal discomfort preceding defecation and resolving after he has a bowel movement. CT scan of abdomen and pelvis was ordered and came back unremarkable Had negative food allergy panel and screening for celiac disease. I recommended to try a small dose of Citrucel and to taper it up. Patient said that he tried it for 2 week but Citrucel made his symptoms worse- was feeling constipated and \"dehydrated\". He stopped the supplement.  Reported no significant changes in his stools, they are ranging from a few loose [place] type 6 or 7 to formed stools. Random abdominal cramping is still present.  Patient stated that he is working on diet change, staying away from greasy foods and raw veggies and this helps. He also cut down on alcohol intake.    Noted that in the past, the patient was seen and evaluated by Dr. Leos  for chronic sinusitis and year around allergies. Total serum IgE was within normal limits. Serum IgE for regional aeroallergen panel showed slight sensitivity to pollen of maple tree.       PREVIOUS ENDOSCOPY:  None  PERTINENT STUDIES Reviewed in EMR  1/21/2025 CT abdomen/pelvis  FINDINGS:   LOWER CHEST: Clear visualized lung bases.   HEPATOBILIARY: No focal hepatic mass, biliary dilatation, or calcified " gallstones.   PANCREAS: No focal pancreatic mass, peripancreatic inflammation, or pancreatic ductal dilatation.   SPLEEN: Normal size.   ADRENAL GLANDS: No nodules.   KIDNEYS/BLADDER: No suspicious renal mass or hydronephrosis. No urinary bladder wall thickening.   BOWEL: Unremarkable appearance of the visualized portions of the distal esophagus, stomach, and small bowel. The appendix is normal in caliber and without periappendiceal inflammation. The colon appears unremarkable. No ascites.   LYMPH NODES: No abdominal or pelvic lymphadenopathy.   VASCULATURE: Normal caliber abdominal aorta. Patent portal, splenic, and mesenteric veins. Patent bilateral renal veins.   PELVIC ORGANS: No large pelvic mass.   MUSCULOSKELETAL: No aggressive osseous lesion.                                                        IMPRESSION:   1.  No findings to explain patient's reported symptoms.    ROS: 10pt ROS performed and otherwise negative.    PAST MEDICAL HISTORY:  Past Medical History:   Diagnosis Date     Elevated blood pressure reading without diagnosis of hypertension 12/16/2019     Lyme disease 2000       PREVIOUS ABDOMINAL/GYNECOLOGIC SURGERIES:  Past Surgical History:   Procedure Laterality Date     ENDOSCOPIC BALLOON SINUPLASTY ACCLARENT Bilateral 5/11/2021    Procedure: ENDOSCOPIC BALLOON SINUPLASTY ACCLARENT;  Surgeon: Timur Triana MD;  [facility]     SEPTOPLASTY, TURBINOPLASTY, COMBINED Bilateral 5/11/2021    Procedure: SEPTOPLASTY, NOSE, WITH submucous resection of turbinates;  Surgeon: Timur Triana MD;  Location: [facility]     TONSILLECTOMY & ADENOIDECTOMY  12/06         PERTINENT MEDICATIONS:  Current Outpatient Medications   Medication Sig Dispense Refill     fluticasone (FLONASE) 50 MCG/ACT nasal spray Spray 1 spray into both nostrils daily       losartan (COZAAR) 25 MG tablet Take 1 tablet (25 mg) by mouth daily. 90 tablet 3         SOCIAL HISTORY:  Social History     Socioeconomic History     Marital  status: Single     Spouse name: Not on file     Number of children: Not on file     Years of education: Not on file     Highest education level: Not on file   Occupational History     Occupation: heating and air   Tobacco Use     Smoking status: Former     Types: Cigarettes     Smokeless tobacco: Former     Types: Chew     Quit date: 07/2024     Tobacco comments:     sometimes   Vaping Use     Vaping status: Some Days   Substance and Sexual Activity     Alcohol use: Yes     Drug use: Not Currently     Types: Marijuana     Comment: not for over a year 11/24/2020     Sexual activity: Yes   Other Topics Concern      Service Not Asked     Blood Transfusions Not Asked     Caffeine Concern Not Asked     Occupational Exposure Not Asked     Hobby Hazards Not Asked     Sleep Concern Not Asked     Stress Concern Not Asked     Weight Concern Not Asked     Special Diet Not Asked     Back Care Not Asked     Exercise Not Asked     Bike Helmet Not Asked     Seat Belt Not Asked     Self-Exams Not Asked     Parent/sibling w/ CABG, MI or angioplasty before 65F 55M? Not Asked   Social History Narrative    6/20/23    ENVIRONMENTAL HISTORY: [place] The home is heated with a forced air. They do have central air conditioning. The patient's bedroom is furnished with feather/wool bedding or pillows and carpeting in bedroom.  Pets inside the house include 0 pets. There is no history of cockroach or mice infestation. There is/are 0 smokers in the house.  The house does not have a damp basement.      Social Drivers of Health     Financial Resource Strain: Low Risk  (9/12/2024)    Financial Resource Strain      Within the past 12 months, have you or your family members you live with been unable to get utilities (heat, electricity) when it was really needed?: No   Food Insecurity: Low Risk  (9/12/2024)    Food Insecurity      Within the past 12 months, did you worry that your food would run  out before you got money to buy more?: No      Within the past 12 months, did the food you bought just not last and you didn t have money to get more?: No   Transportation Needs: Low Risk  (9/12/2024)    Transportation Needs      Within the past 12 months, has lack of transportation kept you from medical appointments, getting your medicines, non-medical meetings or appointments, work, or from getting things that you need?: No   Physical Activity: Unknown (9/12/2024)    Exercise Vital Sign      Days of Exercise per Week: 7 days      Minutes of Exercise per Session: Not on file   Stress: Stress Concern Present (9/12/2024)    [employer] [place] [employer] - Occupational Stress Questionnaire      Feeling of Stress : Rather much   Social Connections: Unknown (9/12/2024)    Social Connection and Isolation Panel [NHANES]      Frequency of Communication with Friends and Family: Not on file      Frequency of Social Gatherings with Friends and Family: More than three times a week      Attends Pentecostal Services: Not on file      Active Member of Clubs or Organizations: Not on file      Attends Club or Organization Meetings: Not on file      Marital Status: Not on file   Interpersonal Safety: Low Risk  (9/12/2024)    Interpersonal Safety      Do you feel physically and emotionally safe where you currently live?: Yes      Within the past 12 months, have you been hit, slapped, kicked or otherwise physically hurt by someone?: No      Within the past 12 months, have you been humiliated or emotionally abused in other ways by your partner or ex-partner?: No   Housing Stability: Low Risk  (9/12/2024)    Housing Stability      Do you have housing? : Yes      Are you worried about losing your housing?: No       FAMILY HISTORY:  Denies colon/panc/esophageal/other GI CA, no other Squires or other HPS-related Ruthie. No IBD/celiac, no other AI/liver/thyroid disease.    Family History   Problem Relation Age of Onset     Diabetes  "Mother         Type 1     Breast Cancer Maternal Grandmother      Circulatory Paternal Grandmother         Annuersym     Diabetes Other         paternal cousin Type 1     Asthma No family hx of        PHYSICAL EXAMINATION:  Vitals reviewed  /70 (BP Location: Right arm, Patient Position: Sitting, Cuff Size: Adult Large)   Pulse (!) 48   Ht 1.911 m (6' 3.25\")   Wt 130.2 kg (287 lb)   BMI 35.63 kg/m      General: Patient appears well in no acute distress.    Skin: No visualized rash or lesions on visualized skin  HEENT:    EOMI, no erythema, sclera icterus or discharge noted.  Mouth mucosa intact, pink, moist  No cervical or supraclavicular lymphadenopathy. Thyroid gland not enlarged.  Resp: breathing comfortably without accessory muscle usage, speaking in full sentences, no cough. Lung sounds clear  Card: Regular and rhythmic S1 and S2. No gallop or rub. No murmur.  No LE edema.  Abdomen: Active bowel sounds X 4 quadrants. Soft to palpation.  No guarding or rebound tenderness. Martins's sign negative.  MSK: Appears to have normal range of motion based on visualized movements  Neurologic: No apparent tremors, facial movements symmetric  Psych: affect normal, alert and oriented      ASSESSMENT/PLAN:    ICD-10-CM    1. Functional gastrointestinal disorder  K92.9       2. Abdominal discomfort  R10.9       3. Alternating constipation and diarrhea  R19.8          29 year old male  presented to GI clinic for a follow up. I saw the patient for complains of irregular stools that started about 8 or 10 years ago. He suspected some food sensitivity or allergies because every time he consumes raw veggies, eggs, or alcohol, he develops diarrhea.  Some days, he is having up to 10 stools a day.  Other times, his stools are formed and he may have 1 or 2 bowel movements a day.  Complained of generalized abdominal discomfort preceding bowel movements.  He denies any other GI symptoms such as heart burn, nausea, vomiting, " "hematochezia, melena, dysphagia, poor appetite, or weight loss. Patient mentioned that he used to drink alcohol, 10+ drinks during weekdays and \"a little more\" on the weekends. Decided to cut down to 1-2 drinks a week. Stated that his diarrhea was worse when he was drinking heavy.     Patient screened negative for celiac disease and food allergies.  He had unremarkable CT scan of abdomen and pelvis.  Stated he is not able to tolerate Citrucel because it was making his constipation worse.  We talked about functional GI tract disorder as the most likely etiology of his symptoms.  I also mentioned chronic constipation with overflow diarrhea.  Recommended to keep a food diary to identify potential triggers of his symptoms.  Continue working on reduction of alcohol intake.  May try different type of fiber or MiraLAX daily.  Educated on red flag symptoms and when to seek medical attention.    Patient verbalized understanding and appreciation of care provided. Stated that all of the questions were answered to her/his satisfaction.  RTC as needed    Thank you for this consultation. It was a pleasure to participate in the care of this patient; please contact us with any further questions.    GEOVANY Salazar, FNP-C  Division of Gastroenterology  [facility], [place], [place]    This note was created with Dragon voice recognition software, and while reviewed for accuracy, inadvertent minor typographic errors may occur. Please contact the provider if you have any questions.      Again, thank you for allowing me to participate in the care of your patient.        Sincerely,        GEOVANY SALAZAR CNP    Electronically signed"

## 2025-05-19 ENCOUNTER — OFFICE VISIT (OUTPATIENT)
Dept: FAMILY MEDICINE | Facility: OTHER | Age: 30
End: 2025-05-19
Payer: COMMERCIAL

## 2025-05-19 ENCOUNTER — ANCILLARY PROCEDURE (OUTPATIENT)
Dept: GENERAL RADIOLOGY | Facility: OTHER | Age: 30
End: 2025-05-19
Attending: PHYSICIAN ASSISTANT
Payer: COMMERCIAL

## 2025-05-19 ENCOUNTER — MYC MEDICAL ADVICE (OUTPATIENT)
Dept: FAMILY MEDICINE | Facility: OTHER | Age: 30
End: 2025-05-19

## 2025-05-19 VITALS
SYSTOLIC BLOOD PRESSURE: 130 MMHG | HEART RATE: 55 BPM | RESPIRATION RATE: 13 BRPM | WEIGHT: 276.5 LBS | TEMPERATURE: 97.9 F | OXYGEN SATURATION: 100 % | HEIGHT: 75 IN | BODY MASS INDEX: 34.38 KG/M2 | DIASTOLIC BLOOD PRESSURE: 68 MMHG

## 2025-05-19 DIAGNOSIS — S82.832S OTHER CLOSED FRACTURE OF DISTAL END OF LEFT FIBULA, SEQUELA: ICD-10-CM

## 2025-05-19 DIAGNOSIS — M25.572 PAIN IN JOINT, ANKLE AND FOOT, LEFT: ICD-10-CM

## 2025-05-19 DIAGNOSIS — R36.1 BLOOD IN SEMEN: ICD-10-CM

## 2025-05-19 DIAGNOSIS — E66.812 CLASS 2 SEVERE OBESITY DUE TO EXCESS CALORIES WITH SERIOUS COMORBIDITY AND BODY MASS INDEX (BMI) OF 38.0 TO 38.9 IN ADULT (H): ICD-10-CM

## 2025-05-19 DIAGNOSIS — E66.01 CLASS 2 SEVERE OBESITY DUE TO EXCESS CALORIES WITH SERIOUS COMORBIDITY AND BODY MASS INDEX (BMI) OF 38.0 TO 38.9 IN ADULT (H): ICD-10-CM

## 2025-05-19 DIAGNOSIS — M25.572 PAIN IN JOINT, ANKLE AND FOOT, LEFT: Primary | ICD-10-CM

## 2025-05-19 LAB
ALBUMIN UR-MCNC: NEGATIVE MG/DL
APPEARANCE UR: CLEAR
BILIRUB UR QL STRIP: NEGATIVE
COLOR UR AUTO: YELLOW
GLUCOSE UR STRIP-MCNC: NEGATIVE MG/DL
HGB UR QL STRIP: NEGATIVE
KETONES UR STRIP-MCNC: NEGATIVE MG/DL
LEUKOCYTE ESTERASE UR QL STRIP: NEGATIVE
NITRATE UR QL: NEGATIVE
PH UR STRIP: 6 [PH] (ref 5–7)
PSA SERPL DL<=0.01 NG/ML-MCNC: 0.75 NG/ML
SP GR UR STRIP: 1.02 (ref 1–1.03)
UROBILINOGEN UR STRIP-ACNC: 0.2 E.U./DL

## 2025-05-19 PROCEDURE — 81003 URINALYSIS AUTO W/O SCOPE: CPT | Performed by: PHYSICIAN ASSISTANT

## 2025-05-19 PROCEDURE — 73610 X-RAY EXAM OF ANKLE: CPT | Mod: TC | Performed by: STUDENT IN AN ORGANIZED HEALTH CARE EDUCATION/TRAINING PROGRAM

## 2025-05-19 PROCEDURE — 3078F DIAST BP <80 MM HG: CPT | Performed by: PHYSICIAN ASSISTANT

## 2025-05-19 PROCEDURE — G0103 PSA SCREENING: HCPCS | Performed by: PHYSICIAN ASSISTANT

## 2025-05-19 PROCEDURE — 99214 OFFICE O/P EST MOD 30 MIN: CPT | Performed by: PHYSICIAN ASSISTANT

## 2025-05-19 PROCEDURE — 1125F AMNT PAIN NOTED PAIN PRSNT: CPT | Performed by: PHYSICIAN ASSISTANT

## 2025-05-19 PROCEDURE — 36415 COLL VENOUS BLD VENIPUNCTURE: CPT | Performed by: PHYSICIAN ASSISTANT

## 2025-05-19 PROCEDURE — 3075F SYST BP GE 130 - 139MM HG: CPT | Performed by: PHYSICIAN ASSISTANT

## 2025-05-19 PROCEDURE — G2211 COMPLEX E/M VISIT ADD ON: HCPCS | Performed by: PHYSICIAN ASSISTANT

## 2025-05-19 ASSESSMENT — PAIN SCALES - GENERAL: PAINLEVEL_OUTOF10: SEVERE PAIN (7)
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
## 2025-05-19 NOTE — TELEPHONE ENCOUNTER
Per charting, patient has scheduled appointment in clinic today.    Will close this encounter.    Anamika Buenrostro RN on 5/19/2025 at 9:19 AM

## 2025-05-20 ENCOUNTER — ANCILLARY PROCEDURE (OUTPATIENT)
Dept: ULTRASOUND IMAGING | Facility: OTHER | Age: 30
End: 2025-05-20
Attending: PHYSICIAN ASSISTANT
Payer: COMMERCIAL

## 2025-05-20 ENCOUNTER — RESULTS FOLLOW-UP (OUTPATIENT)
Dept: FAMILY MEDICINE | Facility: OTHER | Age: 30
End: 2025-05-20

## 2025-05-20 DIAGNOSIS — R36.1 BLOOD IN SEMEN: Primary | ICD-10-CM

## 2025-05-20 DIAGNOSIS — R36.1 BLOOD IN SEMEN: ICD-10-CM

## 2025-05-20 PROCEDURE — 76870 US EXAM SCROTUM: CPT | Mod: TC | Performed by: RADIOLOGY

## 2025-05-20 PROCEDURE — 93976 VASCULAR STUDY: CPT | Mod: TC | Performed by: RADIOLOGY

## 2025-05-26 ENCOUNTER — PATIENT OUTREACH (OUTPATIENT)
Dept: CARE COORDINATION | Facility: CLINIC | Age: 30
End: 2025-05-26
Payer: COMMERCIAL

## 2025-05-27 ENCOUNTER — OFFICE VISIT (OUTPATIENT)
Dept: UROLOGY | Facility: CLINIC | Age: 30
End: 2025-05-27
Payer: COMMERCIAL

## 2025-05-27 VITALS
SYSTOLIC BLOOD PRESSURE: 130 MMHG | DIASTOLIC BLOOD PRESSURE: 62 MMHG | HEART RATE: 55 BPM | TEMPERATURE: 98.3 F | OXYGEN SATURATION: 94 % | WEIGHT: 265 LBS | RESPIRATION RATE: 18 BRPM | HEIGHT: 75 IN | BODY MASS INDEX: 32.95 KG/M2

## 2025-05-27 DIAGNOSIS — R36.1 BLOOD IN SEMEN: ICD-10-CM

## 2025-05-27 DIAGNOSIS — R36.1 HEMATOSPERMIA: Primary | ICD-10-CM

## 2025-05-27 DIAGNOSIS — N50.3 EPIDIDYMAL CYST: ICD-10-CM

## 2025-05-27 ASSESSMENT — PAIN SCALES - GENERAL: PAINLEVEL_OUTOF10: NO PAIN (0)

## 2025-05-27 NOTE — PROGRESS NOTES
S: Guillermo Terrell is a pleasant 29 year old male  with history of HTN presenting to clinic for hematospermia and testicular cyst.     History of Present Illness-  Mr. Terrell, 29 years    Blood in Semen  Mr. Terrell reported experiencing blood in his semen, which has occurred twice over the past four months. He described the associated pain as moderate, rating it around 5-6 out of 10, and noted that the pain occurs right at the end of climaxing. He has not experienced any discharge from the penis or issues with erections. Denied fertility issues.   He has not observed blood in his urine, although he has noticed cloudy urine at times. He does not experience any changes in the stream, urgency, or frequency of urination. He mentioned that he quit taking ibuprofen a long time ago, although he occasionally takes it, with the last instance being over the past weekend. He primarily uses acetaminophen for pain relief.    Smoking History  Mr. Terrell used to smoke but now primarily uses nicotine pouches.    Lab Results   Component Value Date    PSA 0.75 05/19/2025         Allergies   Allergen Reactions    Lorabid [Cephalosporins] Rash     When he was around [age] year old.     Past Medical History:   Diagnosis Date    Elevated blood pressure reading without diagnosis of hypertension 12/16/2019    Lyme disease 2000     Past Surgical History:   Procedure Laterality Date    ENDOSCOPIC BALLOON SINUPLASTY ACCLARENT Bilateral 5/11/2021    Procedure: ENDOSCOPIC BALLOON SINUPLASTY ACCLARENT;  Surgeon: Timur Triana MD;  Location: [facility] OR    SEPTOPLASTY, TURBINOPLASTY, COMBINED Bilateral 5/11/2021    Procedure: SEPTOPLASTY, NOSE, WITH submucous resection of turbinates;  Surgeon: Timur Triana MD;  Location: [facility] OR    TONSILLECTOMY & ADENOIDECTOMY  12/06      Family History   Problem Relation Age of Onset    Diabetes Mother         Type 1    Breast Cancer Maternal Grandmother     Circulatory Paternal Grandmother         Annuersym     Diabetes Other         paternal cousin Type 1    Asthma No family hx of      Social History     Socioeconomic History    Marital status: Single     Spouse name: None    Number of children: None    Years of education: None    Highest education level: None   Occupational History    Occupation: heating and air   Tobacco Use    Smoking status: Former     Types: Cigarettes     Passive exposure: Never    Smokeless tobacco: Former     Types: Chew     Quit date: 07/2024    Tobacco comments:     sometimes   Vaping Use    Vaping status: Former    Substances: Nicotine    Devices: Disposable   Substance and Sexual Activity    Alcohol use: Yes    Drug use: Not Currently     Types: Marijuana     Comment: not for over a year 11/24/2020    Sexual activity: Yes   Social History Narrative    6/20/23    ENVIRONMENTAL HISTORY: [place] is heated with a forced air. They do have central air conditioning. The patient's bedroom is furnished with feather/wool bedding or pillows and carpeting in bedroom.  Pets inside the house include 0 pets. There is no history of cockroach or mice infestation. There is/are 0 smokers in the house.  The house does not have a damp basement.      Social Drivers of Health     Financial Resource Strain: Low Risk  (9/12/2024)    Financial Resource Strain     Within the past 12 months, have you or your family members you live with been unable to get utilities (heat, electricity) when it was really needed?: No   Food Insecurity: Low Risk  (9/12/2024)    Food Insecurity     Within the past 12 months, did you worry that your food would run out before you got money to buy more?: No     Within the past 12 months, did the food you bought just not last and you didn t have money to get more?: No   Transportation Needs: Low Risk  (9/12/2024)    Transportation Needs     Within the past 12 months, has lack of transportation kept you from medical appointments, getting your  medicines, non-medical meetings or appointments, work, or from getting things that you need?: No   Physical Activity: Unknown (9/12/2024)    Exercise Vital Sign     Days of Exercise per Week: 7 days   Stress: Stress Concern Present (9/12/2024)    [employer] - Occupational Stress Questionnaire     Feeling of Stress : Rather much   Social Connections: Unknown (9/12/2024)    Social Connection and Isolation Panel [NHANES]     Frequency of Social Gatherings with Friends and Family: More than three times a week   Interpersonal Safety: Low Risk  (5/19/2025)    Interpersonal Safety     Do you feel physically and emotionally safe where you currently live?: Yes     Within the past 12 months, have you been hit, slapped, kicked or otherwise physically hurt by someone?: No     Within the past 12 months, have you been humiliated or emotionally abused in other ways by your partner or ex-partner?: No   Housing Stability: Low Risk  (9/12/2024)    Housing Stability     Do you have housing? : Yes     Are you worried about losing your housing?: No       REVIEW OF SYSTEMS  =================  C: NEGATIVE for fever, chills, change in weight  I: NEGATIVE for worrisome rashes, moles or lesions  E/M: NEGATIVE for ear, mouth and throat problems  R: NEGATIVE for significant cough or SHORTNESS OF BREATH  CV:  NEGATIVE for chest pain, palpitations or peripheral edema  GI: NEGATIVE for nausea, abdominal pain, heartburn, or change in bowel habits  NEURO: NEGATIVE numbness/weakness  : see HPI  PSYCH: NEGATIVE depression/anxiety  LYmph: no new enlarged lymph nodes  Ortho: no new trauma/movements  Imaging:           Physical Exam:    Patient is pleasant, in no acute distress, good general condition.  Heart:  negative, PMI normal  Lung: no evidence of respiratory distress    Abdomen: Soft, nondistended, non tender. No masses. No rebound or guarding.  :      Normal penis, no penile plaques or lesions.      Orthotopic  location of the urethral meatus.     Scrotum normal.  EDY:     Normal rectal tone, No amount of stool in the rectal vault.     30 g sized prostate, No tenderness, mass or asymmetry.    Skin: Warm and dry.  No redness.  Neuro: grossly normal  Musculaskeletal: moving all extremities  Psych normal mood and affect  Musculoskeletal  moving all extremities    Assessment/Plan:   Assessment & Plan  Hematospermia  - Hematospermia is a benign condition, likened to epistaxis from the prostate. It is likely due to vascular spasms during ejaculation. Prostate is slightly enlarged but functioning normally. Fertility is not affected.  - If hematospermia occurs repeatedly, reach out for further evaluation.    Epididymal cyst  - The ultrasound imaging revealed a benign cyst on the epididymis. Reassurance that this is a benign condition.    GEOVANY Titus CNP      Consent was obtained from the patient to use an AI documentation tool in the creation of this note.  Voice recognition software was also used.  There may be associated transcribing errors.

## (undated) DEVICE — Device

## (undated) DEVICE — TUBING IV EXTENSION SET 34"

## (undated) DEVICE — PACK HEAD NECK CUSTOM

## (undated) DEVICE — BLADE SHAVER OLYMPUS 2MM SMR TYPE A BB2000SA

## (undated) DEVICE — SPLINT NASAL DOYLE BREATHEASY 20-10500

## (undated) DEVICE — DRSG GAUZE 4X4" 2187

## (undated) DEVICE — SU ETHILON 3-0 PS-2 18" 1669H

## (undated) DEVICE — GLOVE PROTEXIS W/NEU-THERA 8.0  2D73TE80

## (undated) DEVICE — TUBING SET OLYMPUS MULTI-DEBRIDER DECLOG TS101DC

## (undated) DEVICE — SINUS BALL INFLATION DEVICE BID30

## (undated) DEVICE — SOL NACL 0.9% INJ 1000ML BAG 07983-09

## (undated) DEVICE — WIPE INSTRUMENT MEROCEL

## (undated) DEVICE — SU CHROMIC 4-0 FS-2 27" 635H

## (undated) DEVICE — DRSG TELFA 3X8" 1238

## (undated) DEVICE — NDL ANGIOCATH 14GA 1.25" 3068

## (undated) DEVICE — SPONGE COTTONOID 1/2X3" 80-1407

## (undated) DEVICE — TAPE TRANSPORE 1/2"

## (undated) DEVICE — BLADE KNIFE SURG 15 371115

## (undated) DEVICE — SYR 50ML LL W/O NDL 309653

## (undated) DEVICE — ANTIFOG SOLUTION W/FOAM PAD 31142527

## (undated) RX ORDER — MUPIROCIN 20 MG/G
OINTMENT TOPICAL
Status: DISPENSED
Start: 2021-05-11

## (undated) RX ORDER — PROPOFOL 10 MG/ML
INJECTION, EMULSION INTRAVENOUS
Status: DISPENSED
Start: 2021-05-11

## (undated) RX ORDER — DEXAMETHASONE SODIUM PHOSPHATE 10 MG/ML
INJECTION, SOLUTION INTRAMUSCULAR; INTRAVENOUS
Status: DISPENSED
Start: 2021-05-11

## (undated) RX ORDER — EPINEPHRINE 1 MG/ML
INJECTION, SOLUTION INTRAMUSCULAR; SUBCUTANEOUS
Status: DISPENSED
Start: 2021-05-11

## (undated) RX ORDER — HYDROMORPHONE HYDROCHLORIDE 1 MG/ML
INJECTION, SOLUTION INTRAMUSCULAR; INTRAVENOUS; SUBCUTANEOUS
Status: DISPENSED
Start: 2021-05-11

## (undated) RX ORDER — LIDOCAINE HYDROCHLORIDE AND EPINEPHRINE 10; 10 MG/ML; UG/ML
INJECTION, SOLUTION INFILTRATION; PERINEURAL
Status: DISPENSED
Start: 2021-05-11

## (undated) RX ORDER — GLYCOPYRROLATE 0.2 MG/ML
INJECTION INTRAMUSCULAR; INTRAVENOUS
Status: DISPENSED
Start: 2021-05-11

## (undated) RX ORDER — LIDOCAINE HYDROCHLORIDE 20 MG/ML
INJECTION, SOLUTION EPIDURAL; INFILTRATION; INTRACAUDAL; PERINEURAL
Status: DISPENSED
Start: 2021-05-11

## (undated) RX ORDER — FENTANYL CITRATE 50 UG/ML
INJECTION, SOLUTION INTRAMUSCULAR; INTRAVENOUS
Status: DISPENSED
Start: 2021-05-11

## (undated) RX ORDER — OXYMETAZOLINE HYDROCHLORIDE 0.05 G/100ML
SPRAY NASAL
Status: DISPENSED
Start: 2021-05-11